# Patient Record
Sex: MALE | Race: WHITE | NOT HISPANIC OR LATINO | Employment: OTHER | URBAN - METROPOLITAN AREA
[De-identification: names, ages, dates, MRNs, and addresses within clinical notes are randomized per-mention and may not be internally consistent; named-entity substitution may affect disease eponyms.]

---

## 2023-03-24 PROBLEM — M79.673 FOOT PAIN: Status: ACTIVE | Noted: 2023-03-24

## 2023-03-24 PROBLEM — I10 HYPERTENSION: Status: ACTIVE | Noted: 2023-03-24

## 2023-03-24 PROBLEM — E78.00 HYPERCHOLESTEROLEMIA: Status: ACTIVE | Noted: 2023-03-24

## 2023-03-24 PROBLEM — M25.572 BILATERAL ANKLE PAIN: Status: ACTIVE | Noted: 2023-03-24

## 2023-03-24 PROBLEM — M25.571 BILATERAL ANKLE PAIN: Status: ACTIVE | Noted: 2023-03-24

## 2023-03-24 RX ORDER — LOSARTAN POTASSIUM 100 MG/1
1 TABLET ORAL DAILY
COMMUNITY
Start: 2021-02-15 | End: 2023-05-24

## 2023-03-24 RX ORDER — CYCLOBENZAPRINE HCL 10 MG
1 TABLET ORAL 3 TIMES DAILY
COMMUNITY
Start: 2022-08-12

## 2023-04-15 LAB
ALANINE AMINOTRANSFERASE (SGPT) (U/L) IN SER/PLAS: 31 U/L (ref 10–52)
ALBUMIN (G/DL) IN SER/PLAS: 4.2 G/DL (ref 3.4–5)
ALKALINE PHOSPHATASE (U/L) IN SER/PLAS: 70 U/L (ref 33–136)
ANION GAP IN SER/PLAS: 11 MMOL/L (ref 10–20)
ASPARTATE AMINOTRANSFERASE (SGOT) (U/L) IN SER/PLAS: 16 U/L (ref 9–39)
BILIRUBIN TOTAL (MG/DL) IN SER/PLAS: 0.6 MG/DL (ref 0–1.2)
CALCIUM (MG/DL) IN SER/PLAS: 9.4 MG/DL (ref 8.6–10.6)
CARBON DIOXIDE, TOTAL (MMOL/L) IN SER/PLAS: 23 MMOL/L (ref 21–32)
CHLORIDE (MMOL/L) IN SER/PLAS: 109 MMOL/L (ref 98–107)
CHOLESTEROL (MG/DL) IN SER/PLAS: 168 MG/DL (ref 0–199)
CHOLESTEROL IN HDL (MG/DL) IN SER/PLAS: 37.5 MG/DL
CHOLESTEROL/HDL RATIO: 4.5
CREATININE (MG/DL) IN SER/PLAS: 1.11 MG/DL (ref 0.5–1.3)
GFR MALE: 73 ML/MIN/1.73M2
GLUCOSE (MG/DL) IN SER/PLAS: 110 MG/DL (ref 74–99)
LDL: 110 MG/DL (ref 0–99)
POTASSIUM (MMOL/L) IN SER/PLAS: 4.8 MMOL/L (ref 3.5–5.3)
PROTEIN TOTAL: 6.6 G/DL (ref 6.4–8.2)
SODIUM (MMOL/L) IN SER/PLAS: 138 MMOL/L (ref 136–145)
TRIGLYCERIDE (MG/DL) IN SER/PLAS: 101 MG/DL (ref 0–149)
UREA NITROGEN (MG/DL) IN SER/PLAS: 20 MG/DL (ref 6–23)
VLDL: 20 MG/DL (ref 0–40)

## 2023-04-17 ENCOUNTER — OFFICE VISIT (OUTPATIENT)
Dept: PRIMARY CARE | Facility: CLINIC | Age: 68
End: 2023-04-17
Payer: MEDICARE

## 2023-04-17 VITALS
DIASTOLIC BLOOD PRESSURE: 70 MMHG | SYSTOLIC BLOOD PRESSURE: 126 MMHG | HEIGHT: 71 IN | BODY MASS INDEX: 26.32 KG/M2 | WEIGHT: 188 LBS

## 2023-04-17 DIAGNOSIS — E78.00 HYPERCHOLESTEROLEMIA: ICD-10-CM

## 2023-04-17 PROCEDURE — 3074F SYST BP LT 130 MM HG: CPT | Performed by: INTERNAL MEDICINE

## 2023-04-17 PROCEDURE — 99213 OFFICE O/P EST LOW 20 MIN: CPT | Performed by: INTERNAL MEDICINE

## 2023-04-17 PROCEDURE — 3078F DIAST BP <80 MM HG: CPT | Performed by: INTERNAL MEDICINE

## 2023-04-17 NOTE — PROGRESS NOTES
"OFFICE NOTE    NAME OF THE PATIENT: Terrence Jackson    YOB: 1955    CHIEF COMPLAINT:  Mr. Jackson today came here for follow-up on various conditions.  He has a success story.  He got first two shots ______ (Leqvio) for his cholesterol and he is doing very good.  No side effects.  He is tolerating well.  He had a blood work done.  Appetite and weight are okay.  No problem.    PAST MEDICAL HISTORY:  Reviewed on EMR, unchanged.    CURRENT MEDICATIONS:  Reviewed on EMR, unchanged.  List reviewed.    ALLERGIES:  Reviewed on EMR, unchanged.    SOCIAL HISTORY:  Reviewed on EMR, unchanged.  He does not smoke and does not drink alcohol.    FAMILY HISTORY:  Reviewed on EMR, unchanged.    REVIEW OF SYSTEMS:  All 12 systems reviewed and pertaining covered in history and physical.    PHYSICAL EXAMINATION  VITAL SIGNS:  As recorded and reviewed from EMR.  RESPIRATORY:  The patient had normal inspirations and expirations.  The breath sounds were equal bilaterally and clear to auscultation.  CARDIOVASCULAR:  The patient had S1 normal, split S2 without obvious rubs, clicks, or murmurs.    GASTROINTESTINAL:  There was no hepatosplenomegaly.  There were no palpable masses and no inguinal nodes.  EXTREMITIES:  Legs had no edema.  NEUROLOGIC:  The patient had normal cranial nerves.  The reflexes, sensory, and motor examination were grossly within normal limits.    LAB WORK:  Laboratory testing discussed.    ASSESSMENT AND PLAN:  High cholesterol.  Numbers are excellent.  Liver okay.  Hypertension, okay.  Kidney and liver fine.  Prostate health, good.  We will repeat blood work in July.  Happy to see him anytime sooner if necessary.      Kindly review this note in conjunction with EMR.     Subjective   Patient ID: Terrence Jackson is a 67 y.o. male who presents for Follow-up.      HPI    Review of Systems    Objective   /70   Ht 1.803 m (5' 11\")   Wt 85.3 kg (188 lb)   BMI 26.22 kg/m²       Physical " Exam    Assessment/Plan   Problem List Items Addressed This Visit    None

## 2023-05-24 DIAGNOSIS — I10 ESSENTIAL (PRIMARY) HYPERTENSION: ICD-10-CM

## 2023-05-24 RX ORDER — LOSARTAN POTASSIUM 100 MG/1
TABLET ORAL
Qty: 90 TABLET | Refills: 1 | Status: SHIPPED | OUTPATIENT
Start: 2023-05-24 | End: 2023-11-16

## 2023-07-12 ENCOUNTER — LAB (OUTPATIENT)
Dept: LAB | Facility: LAB | Age: 68
End: 2023-07-12
Payer: MEDICARE

## 2023-07-12 DIAGNOSIS — E78.00 HYPERCHOLESTEROLEMIA: ICD-10-CM

## 2023-07-12 LAB
ALANINE AMINOTRANSFERASE (SGPT) (U/L) IN SER/PLAS: 25 U/L (ref 10–52)
ALBUMIN (G/DL) IN SER/PLAS: 4.4 G/DL (ref 3.4–5)
ALKALINE PHOSPHATASE (U/L) IN SER/PLAS: 71 U/L (ref 33–136)
ANION GAP IN SER/PLAS: 13 MMOL/L (ref 10–20)
ASPARTATE AMINOTRANSFERASE (SGOT) (U/L) IN SER/PLAS: 16 U/L (ref 9–39)
BILIRUBIN TOTAL (MG/DL) IN SER/PLAS: 0.6 MG/DL (ref 0–1.2)
CALCIUM (MG/DL) IN SER/PLAS: 9.9 MG/DL (ref 8.6–10.6)
CARBON DIOXIDE, TOTAL (MMOL/L) IN SER/PLAS: 25 MMOL/L (ref 21–32)
CHLORIDE (MMOL/L) IN SER/PLAS: 107 MMOL/L (ref 98–107)
CHOLESTEROL (MG/DL) IN SER/PLAS: 168 MG/DL (ref 0–199)
CHOLESTEROL IN HDL (MG/DL) IN SER/PLAS: 33.2 MG/DL
CHOLESTEROL/HDL RATIO: 5.1
CREATININE (MG/DL) IN SER/PLAS: 1.08 MG/DL (ref 0.5–1.3)
GFR MALE: 76 ML/MIN/1.73M2
GLUCOSE (MG/DL) IN SER/PLAS: 106 MG/DL (ref 74–99)
LDL: 89 MG/DL (ref 0–99)
NON HDL CHOLESTEROL: 135 MG/DL
POTASSIUM (MMOL/L) IN SER/PLAS: 4.5 MMOL/L (ref 3.5–5.3)
PROTEIN TOTAL: 6.7 G/DL (ref 6.4–8.2)
SODIUM (MMOL/L) IN SER/PLAS: 140 MMOL/L (ref 136–145)
TRIGLYCERIDE (MG/DL) IN SER/PLAS: 229 MG/DL (ref 0–149)
UREA NITROGEN (MG/DL) IN SER/PLAS: 18 MG/DL (ref 6–23)
VLDL: 46 MG/DL (ref 0–40)

## 2023-07-12 PROCEDURE — 80061 LIPID PANEL: CPT

## 2023-07-12 PROCEDURE — 36415 COLL VENOUS BLD VENIPUNCTURE: CPT

## 2023-07-12 PROCEDURE — 80053 COMPREHEN METABOLIC PANEL: CPT

## 2023-07-13 ENCOUNTER — TELEPHONE (OUTPATIENT)
Dept: PRIMARY CARE | Facility: CLINIC | Age: 68
End: 2023-07-13
Payer: MEDICARE

## 2023-07-14 ENCOUNTER — OFFICE VISIT (OUTPATIENT)
Dept: PRIMARY CARE | Facility: CLINIC | Age: 68
End: 2023-07-14
Payer: MEDICARE

## 2023-07-14 VITALS
DIASTOLIC BLOOD PRESSURE: 72 MMHG | WEIGHT: 185 LBS | SYSTOLIC BLOOD PRESSURE: 142 MMHG | BODY MASS INDEX: 25.9 KG/M2 | HEIGHT: 71 IN

## 2023-07-14 DIAGNOSIS — J40 BRONCHITIS: ICD-10-CM

## 2023-07-14 PROCEDURE — 99214 OFFICE O/P EST MOD 30 MIN: CPT | Performed by: INTERNAL MEDICINE

## 2023-07-14 PROCEDURE — 3077F SYST BP >= 140 MM HG: CPT | Performed by: INTERNAL MEDICINE

## 2023-07-14 PROCEDURE — 1159F MED LIST DOCD IN RCRD: CPT | Performed by: INTERNAL MEDICINE

## 2023-07-14 PROCEDURE — 3078F DIAST BP <80 MM HG: CPT | Performed by: INTERNAL MEDICINE

## 2023-07-14 RX ORDER — AMOXICILLIN AND CLAVULANATE POTASSIUM 875; 125 MG/1; MG/1
875 TABLET, FILM COATED ORAL 2 TIMES DAILY
Qty: 20 TABLET | Refills: 0 | Status: SHIPPED | OUTPATIENT
Start: 2023-07-14 | End: 2023-07-24

## 2023-07-14 RX ORDER — LORATADINE 10 MG/1
10 TABLET ORAL DAILY
Qty: 30 TABLET | Refills: 2 | Status: SHIPPED | OUTPATIENT
Start: 2023-07-14 | End: 2024-06-03 | Stop reason: WASHOUT

## 2023-07-14 NOTE — PROGRESS NOTES
OFFICE NOTE    NAME OF THE PATIENT: Terrence Jackson    YOB: 1955    CHIEF COMPLAINT:   This gentleman today came here for follow-up on various conditions.  Overall, he is a happy fellow.  He is retired.  He is enjoying it.  He lost nine pounds on purpose.  He is taking medications regularly with no side effects.  No chest pain or shortness of breath.    PAST MEDICAL HISTORY:  Reviewed on EMR, unchanged.    CURRENT MEDICATIONS:  Reviewed on EMR, unchanged.  List reviewed.    ALLERGIES:  Reviewed on EMR, unchanged.    SOCIAL HISTORY:  Reviewed on EMR, unchanged.  He does not smoke and does not drink alcohol.    FAMILY HISTORY:  Reviewed on EMR, unchanged.    REVIEW OF SYSTEMS:  All 12 systems reviewed and pertaining covered in history and physical.    PHYSICAL EXAMINATION  VITAL SIGNS:  As recorded and reviewed from EMR.  RESPIRATORY:  The patient had normal inspirations and expirations.  The breath sounds were equal bilaterally and clear to auscultation.  CARDIOVASCULAR:  The patient had S1 normal, split S2 without obvious rubs, clicks, or murmurs.    GASTROINTESTINAL:  There was no hepatosplenomegaly.  There were no palpable masses and no inguinal nodes.  EXTREMITIES:  Legs had no edema.  NEUROLOGIC:  The patient had normal cranial nerves.  The reflexes, sensory, and motor examination were grossly within normal limits.    LAB WORK:  Laboratory testing discussed.    ASSESSMENT AND PLAN:  Hypertension.  Kidney okay.  Cholesterol.  Diet and exercise.  Monitor.  Prostate health, stable.  Colonoscopy up to date.  Do the blood work in three months.  While the patient was here, he has sore throat, congestion, postnasal drip, it looks like congestion.  I gave him Augmentin, Claritin, Robitussin-DM.  He ______ drink enough warm water.  Follow up in three months.  Happy to see him anytime sooner if necessary.      Kindly review this note in conjunction with EMR.     Subjective   Patient ID: Terrence Jackson is a  "68 y.o. male who presents for sick.      HPI    Review of Systems    Objective   /72   Ht 1.803 m (5' 11\")   Wt 83.9 kg (185 lb)   BMI 25.80 kg/m²       Physical Exam    Assessment/Plan   Problem List Items Addressed This Visit    None        "

## 2023-09-12 PROBLEM — H53.9 VISUAL DISTURBANCE: Status: ACTIVE | Noted: 2023-09-12

## 2023-09-12 PROBLEM — H53.419 VISUAL FIELD SCOTOMA: Status: ACTIVE | Noted: 2023-09-12

## 2023-09-12 PROBLEM — H53.9 ABNORMAL VISION: Status: ACTIVE | Noted: 2023-09-12

## 2023-09-12 PROBLEM — E78.5 DYSLIPIDEMIA: Status: ACTIVE | Noted: 2023-09-12

## 2023-09-12 RX ORDER — ACETAMINOPHEN 325 MG/1
2 TABLET ORAL EVERY 4 HOURS PRN
COMMUNITY

## 2023-09-12 RX ORDER — AMLODIPINE BESYLATE 2.5 MG/1
2.5 TABLET ORAL DAILY
COMMUNITY

## 2023-09-12 RX ORDER — TOBRAMYCIN 3 MG/ML
1 SOLUTION/ DROPS OPHTHALMIC EVERY 4 HOURS
COMMUNITY
Start: 2014-08-15

## 2023-09-12 RX ORDER — NAPROXEN SODIUM 220 MG/1
81 TABLET, FILM COATED ORAL DAILY
COMMUNITY

## 2023-09-12 RX ORDER — ATORVASTATIN CALCIUM 20 MG/1
20 TABLET, FILM COATED ORAL NIGHTLY
COMMUNITY

## 2023-09-28 DIAGNOSIS — E78.00 HYPERCHOLESTEREMIA: Primary | ICD-10-CM

## 2023-09-28 RX ORDER — ALBUTEROL SULFATE 0.83 MG/ML
3 SOLUTION RESPIRATORY (INHALATION) AS NEEDED
Status: CANCELLED | OUTPATIENT
Start: 2023-10-06

## 2023-09-28 RX ORDER — DIPHENHYDRAMINE HYDROCHLORIDE 50 MG/ML
50 INJECTION INTRAMUSCULAR; INTRAVENOUS AS NEEDED
Status: CANCELLED | OUTPATIENT
Start: 2023-10-06

## 2023-09-28 RX ORDER — EPINEPHRINE 0.3 MG/.3ML
0.3 INJECTION SUBCUTANEOUS EVERY 5 MIN PRN
Status: CANCELLED | OUTPATIENT
Start: 2023-10-06

## 2023-09-28 RX ORDER — METHYLPREDNISOLONE SODIUM SUCCINATE 40 MG/ML
40 INJECTION INTRAMUSCULAR; INTRAVENOUS AS NEEDED
Status: CANCELLED | OUTPATIENT
Start: 2023-10-06

## 2023-09-28 RX ORDER — FAMOTIDINE 10 MG/ML
20 INJECTION INTRAVENOUS ONCE AS NEEDED
Status: CANCELLED | OUTPATIENT
Start: 2023-10-06

## 2023-10-06 ENCOUNTER — INFUSION (OUTPATIENT)
Dept: INFUSION THERAPY | Facility: CLINIC | Age: 68
End: 2023-10-06
Payer: MEDICARE

## 2023-10-06 VITALS
SYSTOLIC BLOOD PRESSURE: 152 MMHG | DIASTOLIC BLOOD PRESSURE: 92 MMHG | TEMPERATURE: 97.8 F | HEART RATE: 71 BPM | BODY MASS INDEX: 26.11 KG/M2 | OXYGEN SATURATION: 97 % | RESPIRATION RATE: 16 BRPM | WEIGHT: 187.17 LBS

## 2023-10-06 DIAGNOSIS — E78.00 HYPERCHOLESTEREMIA: ICD-10-CM

## 2023-10-06 PROCEDURE — 96372 THER/PROPH/DIAG INJ SC/IM: CPT | Performed by: NURSE PRACTITIONER

## 2023-10-06 RX ORDER — ALBUTEROL SULFATE 0.83 MG/ML
3 SOLUTION RESPIRATORY (INHALATION) AS NEEDED
Status: CANCELLED | OUTPATIENT
Start: 2024-04-03

## 2023-10-06 RX ORDER — DIPHENHYDRAMINE HYDROCHLORIDE 50 MG/ML
50 INJECTION INTRAMUSCULAR; INTRAVENOUS AS NEEDED
Status: CANCELLED | OUTPATIENT
Start: 2024-04-03

## 2023-10-06 RX ORDER — METHYLPREDNISOLONE SODIUM SUCCINATE 40 MG/ML
40 INJECTION INTRAMUSCULAR; INTRAVENOUS AS NEEDED
Status: CANCELLED | OUTPATIENT
Start: 2024-04-03

## 2023-10-06 RX ORDER — FAMOTIDINE 10 MG/ML
20 INJECTION INTRAVENOUS ONCE AS NEEDED
Status: CANCELLED | OUTPATIENT
Start: 2024-04-03

## 2023-10-06 RX ORDER — EPINEPHRINE 0.3 MG/.3ML
0.3 INJECTION SUBCUTANEOUS EVERY 5 MIN PRN
Status: CANCELLED | OUTPATIENT
Start: 2024-04-03

## 2023-10-06 ASSESSMENT — ENCOUNTER SYMPTOMS
DEPRESSION: 0
WHEEZING: 0
CONSTIPATION: 0
SPEECH DIFFICULTY: 0
FEVER: 0
PALPITATIONS: 0
DIZZINESS: 0
NAUSEA: 0
LEG SWELLING: 0
FREQUENCY: 0
DYSURIA: 0
VOMITING: 0
NECK PAIN: 1
COUGH: 0
SORE THROAT: 0
NERVOUS/ANXIOUS: 0
HEADACHES: 1
EYE PROBLEMS: 0
DIARRHEA: 0
DIFFICULTY URINATING: 0
TROUBLE SWALLOWING: 0
FATIGUE: 0
LIGHT-HEADEDNESS: 0
SHORTNESS OF BREATH: 0
BRUISES/BLEEDS EASILY: 0
NUMBNESS: 0
WOUND: 0

## 2023-10-06 ASSESSMENT — PAIN SCALES - GENERAL: PAINLEVEL: 0-NO PAIN

## 2023-10-06 NOTE — PROGRESS NOTES
[facility]   infusion Clinic Note   Date: 2023   Name: Terrence Jackson  : 1955   MRN: [id number]         Reason for Visit: Follow-up and Injections (LEQVIO 284 MG Q 6MO)       Visit Type:: injection     Ordered By:    Accompanied by:Self      Diagnosis: Hypercholesteremia      Allergies:   Allergies as of 10/06/2023    (No Known Allergies)        Current Meds has a current medication list which includes the following prescription(s): acetaminophen, atorvastatin, inclisiran, losartan, amlodipine, aspirin, cyclobenzaprine, loratadine, loratadine, and tobramycin.          Vitals:  Vitals:    10/06/23 0850   BP: (!) 152/92   Pulse: 71   Resp: 16   Temp: 36.6 °C (97.8 °F)   SpO2: 97%   Weight: 84.9 kg (187 lb 2.7 oz)          Infusion Pre-procedure Checklist:   Allergies reviewed: yes   Medications reviewed: yes     Previous reaction to current treatment:No      Assess patient for the concerns below. Document provider notification as appropriate.  - Active or recent infection with/without current antibiotic use No  - Recent or planned invasive dental work No  - Recent or planned surgeries No  - Recently received or plans to receive vaccinations No  - Has treatment related toxicities No  - Is pregnant  N/A    - Does the patient meet criteria to treat? Yes    Provider notified: Not applicable      Pain: 0-no pain [0]'    Is the pain different from normal: No   Is the pain tolerable: n/a   Is your Doctor aware: n/a       Labs: Labs reviewed LIPID PANEL LAST 23 -NEXT LAB DUE ONE MONTH      Fall Risk Screening: Sherin Fall Risk  History of Falling, Immediate or Within 3 Months: No  Secondary Diagnosis: Yes  Intravenous Therapy/Heparin Lock: No  Mental Status: Oriented to own ability       Review of Systems   Constitutional:  Negative for fatigue and fever.   HENT:   Positive for tinnitus. Negative for hearing loss, sore throat and trouble swallowing.    Eyes:  Negative for eye  problems.   Respiratory:  Negative for cough, shortness of breath and wheezing.    Cardiovascular:  Negative for chest pain, leg swelling and palpitations.   Gastrointestinal:  Negative for constipation, diarrhea, nausea and vomiting.   Genitourinary:  Negative for bladder incontinence, difficulty urinating, dysuria and frequency.    Musculoskeletal:  Positive for neck pain.   Skin:  Negative for itching, rash and wound.   Neurological:  Positive for headaches. Negative for dizziness, light-headedness, numbness and speech difficulty.   Hematological:  Does not bruise/bleed easily.   Psychiatric/Behavioral:  Negative for depression. The patient is not nervous/anxious.       Negative for complaint: [] all other systems have been reviewed and are negative for complaint   Infusion Readiness:   Assessment Concerns Related to Infusion: No  Provider notified: n/a      Document Below Only If Indicated:   Patient Education:       Drug Specific Questions:         Weight Based Drug Calculations:  FLAT DOSE       Initiated By: Noemi Bolden RN   Time: 9:29 AM     We administered inclisiran.

## 2023-10-06 NOTE — PATIENT INSTRUCTIONS
Today you received: LEQVIO 284 MG INJECTION    RETURN TO CLINIC IN 6MO FOR NEXT INJECTION.  HAVE LABS DRAWN AT LEAST ONE MONTH AFTER TODAY'S DOSE BUT BEFORE NEXT VISIT.     For:   1. Hypercholesteremia          Please read the  Medication Guide that was given to you and reviewed during todays visit.     (Tell all doctors including dentists that you are taking this medication)     Go to the emergency room or call 911 if:  -You have signs of allergic reaction:   o         Rash, hives, itching.   o         Swollen, blistered, peeling skin.   o         Swelling of face, lips, mouth, tongue or throat.   o         Tightness of chest, trouble breathing, swallowing or talking      Call your doctor:     - If IV / injection site gets red, warm, swollen, itchy or leaks fluid or pus.     (Leave dressing on your IV site for at least 2 hours and keep area clean and dry  - If you get sick or have symptoms of infection or are not feeling well for any reason.    (Wash your hands often, stay away from people who are sick)  - If you have side effects from your medication that do not go away or are bothersome.     (Refer to the teaching your nurse gave you for side effects to call your doctor about)     Common side effects may include:  stuffy nose, headache, feeling tired, muscle aches, upset stomach  - Before receiving any vaccines, Call the [facility] at   if:  - You get sick, are on antibiotics, have had a recent vaccine, have surgery or dental work and your doctor wants your visit rescheduled.  - You need to cancel and reschedule your visit for any reason. Call at least 2 days before your visit if you need to cancel.   - Your insurance changes before your next visit.    (We will need to get approval from your new insurance. This can take up to two weeks.)     The [facility] is opened Monday thru Friday. We are closed on weekends and holidays.     Voice mail will take your call if the center is  closed. If you leave a message please allow 24 hours for a call back during weekdays. If you leave a message on a weekend/holiday, we will call you back the next business day.

## 2023-10-16 ENCOUNTER — LAB (OUTPATIENT)
Dept: LAB | Facility: LAB | Age: 68
End: 2023-10-16
Payer: MEDICARE

## 2023-10-16 DIAGNOSIS — E78.00 HYPERCHOLESTEREMIA: ICD-10-CM

## 2023-10-16 LAB
CHOLEST SERPL-MCNC: 192 MG/DL (ref 0–199)
CHOLESTEROL/HDL RATIO: 4.9
HDLC SERPL-MCNC: 39.3 MG/DL
LDLC SERPL CALC-MCNC: 122 MG/DL
NON HDL CHOLESTEROL: 153 MG/DL (ref 0–149)
TRIGL SERPL-MCNC: 154 MG/DL (ref 0–149)
VLDL: 31 MG/DL (ref 0–40)

## 2023-10-16 PROCEDURE — 36415 COLL VENOUS BLD VENIPUNCTURE: CPT

## 2023-10-16 PROCEDURE — 80061 LIPID PANEL: CPT

## 2023-10-20 ENCOUNTER — OFFICE VISIT (OUTPATIENT)
Dept: PRIMARY CARE | Facility: CLINIC | Age: 68
End: 2023-10-20
Payer: MEDICARE

## 2023-10-20 VITALS
DIASTOLIC BLOOD PRESSURE: 66 MMHG | BODY MASS INDEX: 26.04 KG/M2 | SYSTOLIC BLOOD PRESSURE: 134 MMHG | WEIGHT: 186 LBS | HEIGHT: 71 IN

## 2023-10-20 DIAGNOSIS — E78.00 HYPERCHOLESTEROLEMIA: ICD-10-CM

## 2023-10-20 DIAGNOSIS — Z12.5 ENCOUNTER FOR PROSTATE CANCER SCREENING: ICD-10-CM

## 2023-10-20 DIAGNOSIS — Z80.42 FAMILY HISTORY OF PROSTATE CANCER: ICD-10-CM

## 2023-10-20 DIAGNOSIS — J40 BRONCHITIS: ICD-10-CM

## 2023-10-20 DIAGNOSIS — J32.9 RHINOSINUSITIS: Primary | ICD-10-CM

## 2023-10-20 DIAGNOSIS — I10 PRIMARY HYPERTENSION: ICD-10-CM

## 2023-10-20 DIAGNOSIS — J02.9 PHARYNGITIS, UNSPECIFIED ETIOLOGY: ICD-10-CM

## 2023-10-20 PROCEDURE — 1126F AMNT PAIN NOTED NONE PRSNT: CPT | Performed by: INTERNAL MEDICINE

## 2023-10-20 PROCEDURE — 99214 OFFICE O/P EST MOD 30 MIN: CPT | Performed by: INTERNAL MEDICINE

## 2023-10-20 PROCEDURE — 3078F DIAST BP <80 MM HG: CPT | Performed by: INTERNAL MEDICINE

## 2023-10-20 PROCEDURE — 1159F MED LIST DOCD IN RCRD: CPT | Performed by: INTERNAL MEDICINE

## 2023-10-20 PROCEDURE — 3075F SYST BP GE 130 - 139MM HG: CPT | Performed by: INTERNAL MEDICINE

## 2023-10-20 RX ORDER — AZITHROMYCIN 250 MG/1
TABLET, FILM COATED ORAL
Qty: 6 TABLET | Refills: 0 | Status: SHIPPED | OUTPATIENT
Start: 2023-10-20 | End: 2023-10-25

## 2023-10-20 ASSESSMENT — ENCOUNTER SYMPTOMS
DEPRESSION: 0
LOSS OF SENSATION IN FEET: 0
OCCASIONAL FEELINGS OF UNSTEADINESS: 0

## 2023-10-20 NOTE — PROGRESS NOTES
"Subjective   Patient ID: Terrence Jackson is a 68 y.o. male who presents for Follow-up (multiple medical issue.).    Terrence Jackson today came here for multiple medical issue.  1. Cough, yellow sputum, sinus congestion, bronchitis, headache going on for several days.  Over-the-counter medications not helping.  2. Follow up on blood work.  Appetite and weight are okay.  No problem.  He golfs two to three times a week and he walks all 16 holes.    I have personally reviewed the patient's Past Medical History, Medications, Allergies, Social History, and Family History in the EMR.    Review of Systems   All other systems reviewed and are negative.    Objective   /66   Ht 1.803 m (5' 11\")   Wt 84.4 kg (186 lb)   BMI 25.94 kg/m²     Physical Exam  Vitals reviewed.   HENT:      Nose: Congestion present.      Comments: Postnasal drip.     Mouth/Throat:      Comments: Throat congested.  Cardiovascular:      Heart sounds: Normal heart sounds, S1 normal and S2 normal. No murmur heard.     No friction rub.   Pulmonary:      Effort: Pulmonary effort is normal.      Breath sounds: Wheezing present.   Abdominal:      Palpations: There is no hepatomegaly, splenomegaly or mass.   Musculoskeletal:      Right lower leg: No edema.      Left lower leg: No edema.   Lymphadenopathy:      Lower Body: No right inguinal adenopathy. No left inguinal adenopathy.   Neurological:      Cranial Nerves: Cranial nerves 2-12 are intact.      Sensory: No sensory deficit.      Motor: Motor function is intact.      Deep Tendon Reflexes: Reflexes are normal and symmetric.     LAB WORK: Laboratory testing discussed.    Assessment/Plan   Problem List Items Addressed This Visit             ICD-10-CM       Cardiac and Vasculature    Hypercholesterolemia E78.00    Relevant Orders    Comprehensive metabolic panel    Lipid panel    Hypertension I10    Relevant Orders    TSH     Other Visit Diagnoses         Codes    Rhinosinusitis    -  Primary J32.9 "    Encounter for prostate cancer screening     Z12.5    Relevant Orders    Prostate Spec.Ag,Screen    Bronchitis     J40    Relevant Medications    azithromycin (Zithromax) 250 mg tablet    Pharyngitis, unspecified etiology     J02.9    Family history of prostate cancer     Z80.42        1. Rhinosinusitis, pharyngitis and bronchitis.  Z-PETEY, Claritin, Robitussin DM.  Monitor.  2. Family history of prostate cancer.  His PSA last year okay.  I will keep an eye.  He has no symptoms.  3. Hypertension, okay.  4. High cholesterol, stable.  5. Blood work ordered.  6. His Medicare Wellness is due.  We are going to do in December.  Continue to follow.    Scribe Attestation  By signing my name below, IEllen Scribe attest that this documentation has been prepared under the direction and in the presence of Ry Acosta MD.    negative...

## 2023-11-27 ENCOUNTER — LAB (OUTPATIENT)
Dept: LAB | Facility: LAB | Age: 68
End: 2023-11-27
Payer: MEDICARE

## 2023-11-27 DIAGNOSIS — E78.00 HYPERCHOLESTEROLEMIA: ICD-10-CM

## 2023-11-27 DIAGNOSIS — Z12.5 ENCOUNTER FOR PROSTATE CANCER SCREENING: ICD-10-CM

## 2023-11-27 DIAGNOSIS — I10 PRIMARY HYPERTENSION: ICD-10-CM

## 2023-11-27 LAB
ALBUMIN SERPL BCP-MCNC: 4.4 G/DL (ref 3.4–5)
ALP SERPL-CCNC: 66 U/L (ref 33–136)
ALT SERPL W P-5'-P-CCNC: 27 U/L (ref 10–52)
ANION GAP SERPL CALC-SCNC: 14 MMOL/L (ref 10–20)
AST SERPL W P-5'-P-CCNC: 17 U/L (ref 9–39)
BILIRUB SERPL-MCNC: 0.7 MG/DL (ref 0–1.2)
BUN SERPL-MCNC: 18 MG/DL (ref 6–23)
CALCIUM SERPL-MCNC: 9.6 MG/DL (ref 8.6–10.6)
CHLORIDE SERPL-SCNC: 107 MMOL/L (ref 98–107)
CHOLEST SERPL-MCNC: 167 MG/DL (ref 0–199)
CHOLESTEROL/HDL RATIO: 4.7
CO2 SERPL-SCNC: 24 MMOL/L (ref 21–32)
CREAT SERPL-MCNC: 1.11 MG/DL (ref 0.5–1.3)
GFR SERPL CREATININE-BSD FRML MDRD: 73 ML/MIN/1.73M*2
GLUCOSE SERPL-MCNC: 105 MG/DL (ref 74–99)
HDLC SERPL-MCNC: 35.3 MG/DL
LDLC SERPL CALC-MCNC: 98 MG/DL
NON HDL CHOLESTEROL: 132 MG/DL (ref 0–149)
POTASSIUM SERPL-SCNC: 4.4 MMOL/L (ref 3.5–5.3)
PROT SERPL-MCNC: 6.9 G/DL (ref 6.4–8.2)
PSA SERPL-MCNC: 0.77 NG/ML
SODIUM SERPL-SCNC: 141 MMOL/L (ref 136–145)
TRIGL SERPL-MCNC: 169 MG/DL (ref 0–149)
TSH SERPL-ACNC: 2.71 MIU/L (ref 0.44–3.98)
VLDL: 34 MG/DL (ref 0–40)

## 2023-11-27 PROCEDURE — 80053 COMPREHEN METABOLIC PANEL: CPT

## 2023-11-27 PROCEDURE — 36415 COLL VENOUS BLD VENIPUNCTURE: CPT

## 2023-11-27 PROCEDURE — 84443 ASSAY THYROID STIM HORMONE: CPT

## 2023-11-27 PROCEDURE — G0103 PSA SCREENING: HCPCS

## 2023-11-27 PROCEDURE — 80061 LIPID PANEL: CPT

## 2023-12-01 ENCOUNTER — OFFICE VISIT (OUTPATIENT)
Dept: PRIMARY CARE | Facility: CLINIC | Age: 68
End: 2023-12-01
Payer: MEDICARE

## 2023-12-01 VITALS
WEIGHT: 190 LBS | DIASTOLIC BLOOD PRESSURE: 74 MMHG | SYSTOLIC BLOOD PRESSURE: 122 MMHG | BODY MASS INDEX: 26.6 KG/M2 | HEIGHT: 71 IN

## 2023-12-01 DIAGNOSIS — Z00.00 MEDICARE ANNUAL WELLNESS VISIT, INITIAL: Primary | ICD-10-CM

## 2023-12-01 DIAGNOSIS — Z13.29 THYROID DISORDER SCREENING: ICD-10-CM

## 2023-12-01 DIAGNOSIS — E78.00 HYPERCHOLESTEROLEMIA: ICD-10-CM

## 2023-12-01 DIAGNOSIS — J40 BRONCHITIS: ICD-10-CM

## 2023-12-01 DIAGNOSIS — H91.90 HEARING LOSS, UNSPECIFIED HEARING LOSS TYPE, UNSPECIFIED LATERALITY: ICD-10-CM

## 2023-12-01 PROCEDURE — 1126F AMNT PAIN NOTED NONE PRSNT: CPT | Performed by: INTERNAL MEDICINE

## 2023-12-01 PROCEDURE — 3074F SYST BP LT 130 MM HG: CPT | Performed by: INTERNAL MEDICINE

## 2023-12-01 PROCEDURE — 1170F FXNL STATUS ASSESSED: CPT | Performed by: INTERNAL MEDICINE

## 2023-12-01 PROCEDURE — G0439 PPPS, SUBSEQ VISIT: HCPCS | Performed by: INTERNAL MEDICINE

## 2023-12-01 PROCEDURE — 1159F MED LIST DOCD IN RCRD: CPT | Performed by: INTERNAL MEDICINE

## 2023-12-01 PROCEDURE — 99214 OFFICE O/P EST MOD 30 MIN: CPT | Performed by: INTERNAL MEDICINE

## 2023-12-01 PROCEDURE — 1160F RVW MEDS BY RX/DR IN RCRD: CPT | Performed by: INTERNAL MEDICINE

## 2023-12-01 PROCEDURE — 3078F DIAST BP <80 MM HG: CPT | Performed by: INTERNAL MEDICINE

## 2023-12-01 RX ORDER — LEVOFLOXACIN 500 MG/1
500 TABLET, FILM COATED ORAL DAILY
Qty: 10 TABLET | Refills: 0 | Status: SHIPPED | OUTPATIENT
Start: 2023-12-01 | End: 2023-12-11

## 2023-12-01 RX ORDER — LORATADINE 10 MG/1
10 TABLET ORAL DAILY
Qty: 30 TABLET | Refills: 2 | Status: SHIPPED | OUTPATIENT
Start: 2023-12-01 | End: 2023-12-23

## 2023-12-01 RX ORDER — GUAIFENESIN 100 MG/5ML
200 SOLUTION ORAL 3 TIMES DAILY PRN
Qty: 120 ML | Refills: 0 | Status: SHIPPED | OUTPATIENT
Start: 2023-12-01 | End: 2023-12-11

## 2023-12-01 ASSESSMENT — ENCOUNTER SYMPTOMS
OCCASIONAL FEELINGS OF UNSTEADINESS: 0
LOSS OF SENSATION IN FEET: 0
DEPRESSION: 0

## 2023-12-01 ASSESSMENT — ACTIVITIES OF DAILY LIVING (ADL)
MANAGING_FINANCES: INDEPENDENT
DOING_HOUSEWORK: INDEPENDENT
BATHING: INDEPENDENT
TAKING_MEDICATION: INDEPENDENT
DRESSING: INDEPENDENT
GROCERY_SHOPPING: INDEPENDENT

## 2023-12-01 ASSESSMENT — PATIENT HEALTH QUESTIONNAIRE - PHQ9
SUM OF ALL RESPONSES TO PHQ9 QUESTIONS 1 AND 2: 0
1. LITTLE INTEREST OR PLEASURE IN DOING THINGS: NOT AT ALL
2. FEELING DOWN, DEPRESSED OR HOPELESS: NOT AT ALL

## 2023-12-01 NOTE — PROGRESS NOTES
"Subjective   Patient ID: Terrence Jackson is a 68 y.o. male who presents for Follow-up (multiple medical issues.).    This gentleman today came here for multiple medical issues.  1. Medicare Wellness visit.  He filled out a form.  2. Bronchitis and cough.  Z-Jarrod did not help.  3. He is getting some hard of hearing.  4. Follow-up on blood work and other conditions.    IMMUNIZATION: I will check his pneumonia and shingles shots.  He needs COVID shot.    HEALTH MAINTENANCE: He had a colonoscopy.    I have personally reviewed the patient's Past Medical History, Medications, Allergies, Social History, and Family History in the EMR.    Review of Systems   All other systems reviewed and are negative.  The patient never had a stroke.  No heart attack.  No diabetes.  No cancer.    Objective   /74   Ht 1.803 m (5' 11\")   Wt 86.2 kg (190 lb)   BMI 26.50 kg/m²     Physical Exam  Vitals reviewed.   HENT:      Right Ear: Tympanic membrane, ear canal and external ear normal.      Left Ear: Tympanic membrane, ear canal and external ear normal.      Nose: Congestion present.      Comments: Postnasal drip.     Mouth/Throat:      Comments: Throat congested.  Eyes:      General: No scleral icterus.     Pupils: Pupils are equal, round, and reactive to light.   Neck:      Vascular: No carotid bruit.   Cardiovascular:      Heart sounds: Normal heart sounds, S1 normal and S2 normal. No murmur heard.     No friction rub.   Pulmonary:      Effort: Pulmonary effort is normal.      Breath sounds: Normal breath sounds and air entry.   Abdominal:      Palpations: There is no hepatomegaly, splenomegaly or mass.   Genitourinary:     Prostate: Enlarged (slightly).   Musculoskeletal:         General: No swelling or deformity. Normal range of motion.      Cervical back: Neck supple.      Right lower leg: No edema.      Left lower leg: No edema.   Lymphadenopathy:      Cervical: No cervical adenopathy.      Upper Body:      Right upper body: " No axillary adenopathy.      Left upper body: No axillary adenopathy.      Lower Body: No right inguinal adenopathy. No left inguinal adenopathy.   Neurological:      Mental Status: He is oriented to person, place, and time.      Cranial Nerves: Cranial nerves 2-12 are intact. No cranial nerve deficit.      Sensory: No sensory deficit.      Motor: Motor function is intact. No weakness.      Gait: Gait is intact.      Deep Tendon Reflexes: Reflexes normal.   Psychiatric:         Mood and Affect: Mood normal. Mood is not anxious or depressed. Affect is not angry.         Behavior: Behavior is not agitated.         Thought Content: Thought content normal.         Judgment: Judgment normal.     LAB WORK: Laboratory testing done last week, discussed.    Assessment/Plan   Problem List Items Addressed This Visit             ICD-10-CM       Cardiac and Vasculature    Hypercholesterolemia E78.00     Other Visit Diagnoses         Codes    Medicare annual wellness visit, initial    -  Primary Z00.00    Hearing loss, unspecified hearing loss type, unspecified laterality     H91.90    Relevant Orders    Tympanometry    Bronchitis     J40    Relevant Medications    levoFLOXacin (Levaquin) 500 mg tablet    loratadine (Claritin) 10 mg tablet    guaiFENesin (Robitussin) 100 mg/5 mL syrup    Thyroid disorder screening     Z13.29        1. Medicare Wellness visit done.  2. The patient is not depressed, not suicidal.  The patient is full code.  The patient gets regular dental and eye checkup.  3. Some hard of hearing.  Audiogram ordered.  4. Bronchitis.  Not better.  I changed to Levaquin and inhaler.  5. High cholesterol.  Numbers good.  Liver good.  6. Prostate health.  PSA okay.  7. Immunization.  I will update immunization once he gets better.  8. Follow up in about three to four weeks.  Happy to see him anytime sooner if necessary.    Scribe Attestation  By signing my name below, IEllen, Scribe attest that this documentation  has been prepared under the direction and in the presence of Ry Acosta MD.

## 2024-01-03 ENCOUNTER — LAB (OUTPATIENT)
Dept: LAB | Facility: LAB | Age: 69
End: 2024-01-03
Payer: MEDICARE

## 2024-01-03 DIAGNOSIS — I10 PRIMARY HYPERTENSION: ICD-10-CM

## 2024-01-03 DIAGNOSIS — E78.00 HYPERCHOLESTEROLEMIA: ICD-10-CM

## 2024-01-08 ENCOUNTER — APPOINTMENT (OUTPATIENT)
Dept: PRIMARY CARE | Facility: CLINIC | Age: 69
End: 2024-01-08
Payer: MEDICARE

## 2024-01-08 ENCOUNTER — CLINICAL SUPPORT (OUTPATIENT)
Dept: AUDIOLOGY | Facility: CLINIC | Age: 69
End: 2024-01-08
Payer: MEDICARE

## 2024-01-08 DIAGNOSIS — H93.13 SUBJECTIVE TINNITUS OF BOTH EARS: ICD-10-CM

## 2024-01-08 DIAGNOSIS — H90.3 SENSORINEURAL HEARING LOSS (SNHL) OF BOTH EARS: Primary | ICD-10-CM

## 2024-01-08 PROCEDURE — 92557 COMPREHENSIVE HEARING TEST: CPT | Performed by: SOCIAL WORKER

## 2024-01-08 PROCEDURE — 92550 TYMPANOMETRY & REFLEX THRESH: CPT | Performed by: SOCIAL WORKER

## 2024-01-08 NOTE — PROGRESS NOTES
Name: Terrence Jackson  YOB: 1955  Age: 68 y.o.    Date of Evaluation:  1/8/24    History:  Reason for visit:  Terrence Jackson is seen today at the request of Ry Acosta MD for an evaluation of hearing.  Patient complains of Hearing Loss and tinnitus for several years. He denies any aural fullness or ear pain. He denies any falls or dizziness.  Positive history of noise exposure    Evaluation:  Otoscopy revealed mild cerumen in the left ear and a clear right canal  Immittance testing indicated Type A normal tympanograms with normal ear canal volume bilaterally  Ipsilateral acoustic reflexes were present at 500-2000 Hz and absent at 4000 Hz bilaterally  Otoacoustic emissions were presnt at 2578-3130 and at 2830 Hz in the Right ear only.   DPOAEs were absent at 3766-4086 Hz in the Left ear    Behavioral hearing testing indicated a mild sloping to moderate sensorineural hearing loss in the right ear and a mild sloping to moderate severe sensorineural hearing loss in the left ear  Word recognition testing was completed using recorded speech at the patient's most comfortable level as documented on the audiogram.  Scores were 92% in the right ear and 72% correct in the left ear.               Summary:  Today's results are consistent with a mild sloping to moderate sensorineural hearing loss in the right ear and a mild sloping to moderate severe sensorineural hearing loss in the left ear. Speech understanding was excellent at conversational loudness in the right ear and fair at louder than conversational loudness in the left ear  Due to asymmetric hearing loss and tinnitus, it is recommended that patient follow up with an Ear, Nose and Throat (ENT) physician. Phone numbers were provided to the patient today  A tinnitus pamphlet was provided to the patient today    Treatment Plan:  Follow up with PCP as directed  Pursue an appointment with ENT  Retest hearing in 12 months

## 2024-01-22 ENCOUNTER — OFFICE VISIT (OUTPATIENT)
Dept: OTOLARYNGOLOGY | Facility: CLINIC | Age: 69
End: 2024-01-22
Payer: MEDICARE

## 2024-01-22 DIAGNOSIS — H91.93 DECREASED HEARING OF BOTH EARS: Primary | ICD-10-CM

## 2024-01-22 PROCEDURE — 1126F AMNT PAIN NOTED NONE PRSNT: CPT | Performed by: OTOLARYNGOLOGY

## 2024-01-22 PROCEDURE — 99203 OFFICE O/P NEW LOW 30 MIN: CPT | Performed by: OTOLARYNGOLOGY

## 2024-01-22 PROCEDURE — 1160F RVW MEDS BY RX/DR IN RCRD: CPT | Performed by: OTOLARYNGOLOGY

## 2024-01-22 ASSESSMENT — ENCOUNTER SYMPTOMS
LOSS OF SENSATION IN FEET: 0
DEPRESSION: 0
OCCASIONAL FEELINGS OF UNSTEADINESS: 0

## 2024-01-22 NOTE — PROGRESS NOTES
"  History Of Present Illness    01.22.2024: Terrence Jackson is a 68 y.o. male presenting with: \"Audio issues\".  He is kindly referred by Dr. Rick Campos.    He has difficulty with hearing in his left ear. It feels \"spongy\".   This has been going on for years.  He has worked in a steel factory for 6 years.  Acoustic trauma (+)  He had a hearing test with Dr. Rick Campos on 01.08.2024. It shows bilateral SN hearing loss. There is asymmetry at middle to high frequencies, but left ear catches right at 8 kHz.    On examination, there was little wax in ears. Cleaning was done. TMs look intact bilaterally.    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 I recommend to follow up in one year with hearing test.     Past Medical History  He has a past medical history of Arthritis, High cholesterol, and Hypertension.    Surgical History  He has a past surgical history that includes MR angio neck wo IV contrast (1/29/2021).     Social History  He reports that he has quit smoking. His smoking use included cigarettes. He does not have any smokeless tobacco history on file. He reports that he does not drink alcohol. No history on file for drug use.    Family History  Family History   Problem Relation Name Age of Onset    No Known Problems Mother      Depression Father          Depressed because of multiple issues.  He commited suicide        Allergies  Patient has no known allergies.    Review of Systems   None reported     Physical Exam    General appearance: Healthy-appearing, well-nourished, well groomed, in no acute distress.     Head and Face: Atraumatic with no masses, lesions, or scarring.      Salivary glands: No tenderness of the parotid glands or parotid masses.     No tenderness of the submandibular glands or submandibular masses.      Facial strength: Normal strength and symmetry, no synkinesis or " "facial tic.     Eyes: Conjunctivas look non-hyperemic bilaterally    Ears: Bilaterally ear canals look normal. Tympanic membranes look intact, no hyperemia, fluid or retraction.    Nose: Mucosa looks normal. No purulent discharge.     Oral Cavity/Mouth: Lips and tongue look normal.     Throat: No postnasal discharge. No tonsil hypertrophy. No hyperemia.    Neck: Symmetrical, trachea midline.     Pulmonary: Normal respiratory effort.     Lymphatic: No palpable pathologic lymph nodes at neck.     Neurological/Psychiatric Orientation to person, place, and time: Normal.     Mood and affect: Normal.      Extremities: No clubbing.     Skin: No significant skin lesions were noted at face or neck        Last Recorded Vitals  There were no vitals taken for this visit.    Relevant Results    Assessment and Plan:  Terrence Jackson is a 68 y.o. male presenting with: \"Audio issues\".  He is kindly referred by Dr. Rick Campos.    He has difficulty with hearing in his left ear. It feel spongy.   This has been going on for years.  He has worked in a steel factory for 6 years.  Acoustic trauma (+)  He had a hearing test with Dr. Rick Campos on 01.08.2024. It shows bilateral SN hearing loss. There is asymmetry at middle to high frequencies, but left ear catches right at 8 kHz.  On examination, there was little wax in ears. Cleaning was done. TMs look intact bilaterally.    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 I recommend to follow up in one year with hearing test.      Thierry Tim  Otolaryngology - Head & Neck Surgery    "

## 2024-01-22 NOTE — LETTER
"January 22, 2024     Rick Campos, AUD, [facility]  [address] [facility]  Audiology Services  [facility] [address]    Patient: Terrence Jackson   YOB: 1955   Date of Visit: 1/22/2024       Dear Rick,    Thank you for referring Mr. Terrence Jackson to me for evaluation. Below are my notes for this consultation.  If you have questions, please do not hesitate to call me. I look forward to following your patient along with you.       Sincerely,     Thierry Tim MD      CC: No Recipients  ______________________________________________________________________________________    History Of Present Illness    Terrence Jackson is a 68 y.o. male presenting with: \"Audio issues\".  He is kindly referred by Dr. Rick Campos.    He has difficulty with hearing in his left ear. It feels \"spongy\".   This has been going on for years.  He has worked in a steel factory for 6 years.  Acoustic trauma (+)  He had a hearing test with Dr. Rcik Campos on 01.08.2024. It shows bilateral SN hearing loss. There is asymmetry at middle to high frequencies, but left ear catches right at 8 kHz.    On examination, there was little wax in ears. Cleaning was done. TMs look intact bilaterally.    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 I recommend to follow up in one year with hearing test.     Past Medical History  He has a past medical history of Arthritis, High cholesterol, and Hypertension.    Surgical History  He has a past surgical history that includes MR angio neck wo IV contrast (1/29/2021).     Social History  He reports that he has quit smoking. His smoking use included cigarettes. He does not have any smokeless tobacco history on file. He reports that he does not drink alcohol. No history on file for drug use.    Family History  Family History   Problem Relation Name Age of Onset   • No Known Problems Mother   " "  • Depression Father          Depressed because of multiple issues.  He commited suicide        Allergies  Patient has no known allergies.    Review of Systems   None reported     Physical Exam    General appearance: Healthy-appearing, well-nourished, well groomed, in no acute distress.     Head and Face: Atraumatic with no masses, lesions, or scarring.      Salivary glands: No tenderness of the parotid glands or parotid masses.     No tenderness of the submandibular glands or submandibular masses.      Facial strength: Normal strength and symmetry, no synkinesis or facial tic.     Eyes: Conjunctivas look non-hyperemic bilaterally    Ears: Bilaterally ear canals look normal. Tympanic membranes look intact, no hyperemia, fluid or retraction.    Nose: Mucosa looks normal. No purulent discharge.     Oral Cavity/Mouth: Lips and tongue look normal.     Throat: No postnasal discharge. No tonsil hypertrophy. No hyperemia.    Neck: Symmetrical, trachea midline.     Pulmonary: Normal respiratory effort.     Lymphatic: No palpable pathologic lymph nodes at neck.     Neurological/Psychiatric Orientation to person, place, and time: Normal.     Mood and affect: Normal.      Extremities: No clubbing.     Skin: No significant skin lesions were noted at face or neck        Last Recorded Vitals  There were no vitals taken for this visit.    Relevant Results    Assessment and Plan:  Terrence Jackson is a 68 y.o. male presenting with: \"Audio issues\".  He is kindly referred by Dr. Rick Campos.    He has difficulty with hearing in his left ear. It feel spongy.   This has been going on for years.  He has worked in a steel factory for 6 years.  Acoustic trauma (+)  He had a hearing test with Dr. Rick Campos on 01.08.2024. It shows bilateral SN hearing loss. There is asymmetry at middle to high frequencies, but left ear catches right at 8 kHz.  On examination, there was little wax in ears. Cleaning was done. TMs look intact " bilaterally.    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 I recommend to follow up in one year with hearing test.      Thierry Tim  Otolaryngology - Head & Neck Surgery

## 2024-02-07 ENCOUNTER — LAB (OUTPATIENT)
Dept: LAB | Facility: LAB | Age: 69
End: 2024-02-07
Payer: MEDICARE

## 2024-02-07 DIAGNOSIS — I10 PRIMARY HYPERTENSION: ICD-10-CM

## 2024-02-07 DIAGNOSIS — E78.00 HYPERCHOLESTEROLEMIA: ICD-10-CM

## 2024-02-07 LAB
ALBUMIN SERPL BCP-MCNC: 4.3 G/DL (ref 3.4–5)
ALP SERPL-CCNC: 70 U/L (ref 33–136)
ALT SERPL W P-5'-P-CCNC: 23 U/L (ref 10–52)
ANION GAP SERPL CALC-SCNC: 9 MMOL/L (ref 10–20)
AST SERPL W P-5'-P-CCNC: 17 U/L (ref 9–39)
BILIRUB SERPL-MCNC: 0.6 MG/DL (ref 0–1.2)
BUN SERPL-MCNC: 19 MG/DL (ref 6–23)
CALCIUM SERPL-MCNC: 9.7 MG/DL (ref 8.6–10.6)
CHLORIDE SERPL-SCNC: 105 MMOL/L (ref 98–107)
CHOLEST SERPL-MCNC: 189 MG/DL (ref 0–199)
CHOLESTEROL/HDL RATIO: 6.1
CO2 SERPL-SCNC: 30 MMOL/L (ref 21–32)
CREAT SERPL-MCNC: 1.17 MG/DL (ref 0.5–1.3)
EGFRCR SERPLBLD CKD-EPI 2021: 69 ML/MIN/1.73M*2
GLUCOSE SERPL-MCNC: 102 MG/DL (ref 74–99)
HDLC SERPL-MCNC: 30.9 MG/DL
LDLC SERPL CALC-MCNC: 115 MG/DL
NON HDL CHOLESTEROL: 158 MG/DL (ref 0–149)
POTASSIUM SERPL-SCNC: 4.4 MMOL/L (ref 3.5–5.3)
PROT SERPL-MCNC: 6.9 G/DL (ref 6.4–8.2)
SODIUM SERPL-SCNC: 140 MMOL/L (ref 136–145)
TRIGL SERPL-MCNC: 215 MG/DL (ref 0–149)
VLDL: 43 MG/DL (ref 0–40)

## 2024-02-07 PROCEDURE — 36415 COLL VENOUS BLD VENIPUNCTURE: CPT

## 2024-02-07 PROCEDURE — 80061 LIPID PANEL: CPT

## 2024-02-07 PROCEDURE — 80053 COMPREHEN METABOLIC PANEL: CPT

## 2024-02-13 ENCOUNTER — OFFICE VISIT (OUTPATIENT)
Dept: PRIMARY CARE | Facility: CLINIC | Age: 69
End: 2024-02-13
Payer: MEDICARE

## 2024-02-13 VITALS
DIASTOLIC BLOOD PRESSURE: 72 MMHG | SYSTOLIC BLOOD PRESSURE: 136 MMHG | HEIGHT: 71 IN | WEIGHT: 190 LBS | BODY MASS INDEX: 26.6 KG/M2

## 2024-02-13 DIAGNOSIS — I10 PRIMARY HYPERTENSION: ICD-10-CM

## 2024-02-13 DIAGNOSIS — K21.9 GASTROESOPHAGEAL REFLUX DISEASE, UNSPECIFIED WHETHER ESOPHAGITIS PRESENT: ICD-10-CM

## 2024-02-13 DIAGNOSIS — E78.00 HYPERCHOLESTEROLEMIA: ICD-10-CM

## 2024-02-13 DIAGNOSIS — T78.40XA ALLERGY, INITIAL ENCOUNTER: Primary | ICD-10-CM

## 2024-02-13 PROCEDURE — 1159F MED LIST DOCD IN RCRD: CPT | Performed by: INTERNAL MEDICINE

## 2024-02-13 PROCEDURE — 3078F DIAST BP <80 MM HG: CPT | Performed by: INTERNAL MEDICINE

## 2024-02-13 PROCEDURE — 99213 OFFICE O/P EST LOW 20 MIN: CPT | Performed by: INTERNAL MEDICINE

## 2024-02-13 PROCEDURE — 3075F SYST BP GE 130 - 139MM HG: CPT | Performed by: INTERNAL MEDICINE

## 2024-02-13 PROCEDURE — 1126F AMNT PAIN NOTED NONE PRSNT: CPT | Performed by: INTERNAL MEDICINE

## 2024-02-13 NOTE — PROGRESS NOTES
"Subjective   Patient ID: Terrence Jackson is a 68 y.o. male who presents for Follow-up (on various conditions).    It is always delight to serve Mr. Jackson.  Today, he came here for follow-up on various conditions.  Appetite and weight are okay.  No problem.    I have personally reviewed the patient's Past Medical History, Medications, Allergies, Social History, and Family History in the EMR.    Review of Systems   All other systems reviewed and are negative.    Objective   /72   Ht 1.803 m (5' 11\")   Wt 86.2 kg (190 lb)   BMI 26.50 kg/m²     Physical Exam  Vitals reviewed.   Cardiovascular:      Heart sounds: Normal heart sounds, S1 normal and S2 normal. No murmur heard.     No friction rub.   Pulmonary:      Effort: Pulmonary effort is normal.      Breath sounds: Normal breath sounds and air entry.   Abdominal:      Palpations: There is no hepatomegaly, splenomegaly or mass.   Musculoskeletal:      Right lower leg: No edema.      Left lower leg: No edema.   Lymphadenopathy:      Lower Body: No right inguinal adenopathy. No left inguinal adenopathy.   Neurological:      Cranial Nerves: Cranial nerves 2-12 are intact.      Sensory: No sensory deficit.      Motor: Motor function is intact.      Deep Tendon Reflexes: Reflexes are normal and symmetric.     LAB WORK: Laboratory testing discussed.    Assessment/Plan   Problem List Items Addressed This Visit             ICD-10-CM       Cardiac and Vasculature    Hypercholesterolemia E78.00    Relevant Orders    Comprehensive Metabolic Panel    Lipid Panel    Ammonia    Hypertension I10    Relevant Orders    Comprehensive Metabolic Panel    Lipid Panel    Ammonia     Other Visit Diagnoses         Codes    Allergy, initial encounter    -  Primary T78.40XA    Gastroesophageal reflux disease, unspecified whether esophagitis present     K21.9        1. Hypertension, okay.  2. Cholesterol.  Shot working very well.  No side effects.  3. Allergy, stable.  4. GERD, on " PPI.  5. Follow-up appointment with me in three months.  Happy to see him anytime sooner if necessary.    Scribe Attestation  By signing my name below, I, Shena Hartman attest that this documentation has been prepared under the direction and in the presence of Ry Acosta MD.

## 2024-02-14 ENCOUNTER — APPOINTMENT (OUTPATIENT)
Dept: PRIMARY CARE | Facility: CLINIC | Age: 69
End: 2024-02-14
Payer: MEDICARE

## 2024-04-12 ENCOUNTER — INFUSION (OUTPATIENT)
Dept: INFUSION THERAPY | Facility: CLINIC | Age: 69
End: 2024-04-12
Payer: MEDICARE

## 2024-04-12 VITALS
BODY MASS INDEX: 26.94 KG/M2 | SYSTOLIC BLOOD PRESSURE: 164 MMHG | DIASTOLIC BLOOD PRESSURE: 89 MMHG | OXYGEN SATURATION: 99 % | RESPIRATION RATE: 17 BRPM | WEIGHT: 193.12 LBS | HEART RATE: 73 BPM | TEMPERATURE: 98.1 F

## 2024-04-12 DIAGNOSIS — E78.00 HYPERCHOLESTEREMIA: ICD-10-CM

## 2024-04-12 PROCEDURE — 96372 THER/PROPH/DIAG INJ SC/IM: CPT | Performed by: NURSE PRACTITIONER

## 2024-04-12 RX ORDER — ALBUTEROL SULFATE 0.83 MG/ML
3 SOLUTION RESPIRATORY (INHALATION) AS NEEDED
OUTPATIENT
Start: 2024-04-13

## 2024-04-12 RX ORDER — DIPHENHYDRAMINE HYDROCHLORIDE 50 MG/ML
50 INJECTION INTRAMUSCULAR; INTRAVENOUS AS NEEDED
OUTPATIENT
Start: 2024-04-13

## 2024-04-12 RX ORDER — EPINEPHRINE 0.3 MG/.3ML
0.3 INJECTION SUBCUTANEOUS EVERY 5 MIN PRN
OUTPATIENT
Start: 2024-04-13

## 2024-04-12 RX ORDER — FAMOTIDINE 10 MG/ML
20 INJECTION INTRAVENOUS ONCE AS NEEDED
OUTPATIENT
Start: 2024-04-13

## 2024-04-12 ASSESSMENT — ENCOUNTER SYMPTOMS
NECK PAIN: 0
LIGHT-HEADEDNESS: 0
SORE THROAT: 0
NUMBNESS: 0
FEVER: 0
DIARRHEA: 0
BRUISES/BLEEDS EASILY: 0
DIZZINESS: 0
NAUSEA: 0
DYSURIA: 0
TROUBLE SWALLOWING: 0
DIFFICULTY URINATING: 0
FREQUENCY: 0
CONSTIPATION: 0
WOUND: 0
SPEECH DIFFICULTY: 0
LEG SWELLING: 0
EYE PROBLEMS: 0
FATIGUE: 0
NERVOUS/ANXIOUS: 0
PALPITATIONS: 0
COUGH: 0
DEPRESSION: 0
SHORTNESS OF BREATH: 0
HEADACHES: 0
VOMITING: 0
WHEEZING: 0

## 2024-04-12 NOTE — PATIENT INSTRUCTIONS
Today :We administered inclisiran.     For:   1. Hypercholesteremia         Your next appointment is due in:  6 MONTHS         Please read the  Medication Guide that was given to you and reviewed during todays visit.     (Tell all doctors including dentists that you are taking this medication)     Go to the emergency room or call 911 if:  -You have signs of allergic reaction:   -Rash, hives, itching.   -Swollen, blistered, peeling skin.   -Swelling of face, lips, mouth, tongue or throat.   -Tightness of chest, trouble breathing, swallowing or talking     Call your doctor:  - If IV / injection site gets red, warm, swollen, itchy or leaks fluid or pus.     (Leave dressing on your IV site for at least 2 hours and keep area clean and dry  - If you get sick or have symptoms of infection or are not feeling well for any reason.    (Wash your hands often, stay away from people who are sick)  - If you have side effects from your medication that do not go away or are bothersome.     (Refer to the teaching your nurse gave you for side effects to call your doctor about)    - Common side effects may include:  stuffy nose, headache, feeling tired, muscle aches, upset stomach  - Before receiving any vaccines     - Call [facility] at   If:  - You get sick, are on antibiotics, have had a recent vaccine, have surgery or dental work and your doctor wants your visit rescheduled.  - You need to cancel and reschedule your visit for any reason. Call at least 2 days before your visit if you need to cancel.   - Your insurance changes before your next visit.    (We will need to get approval from your new insurance. This can take up to two weeks.)     The [facility] is opened Monday thru Friday. We are closed on weekends and holidays.   Voice mail will take your call if the center is closed. If you leave a message please allow 24 hours for a call back during weekdays. If you leave a message on a  weekend/holiday, we will call you back the next business day.

## 2024-04-12 NOTE — PROGRESS NOTES
[facility]   infusion Clinic Note   Date: 2024   Name: Terrence Jackson  : 1955   MRN: [id number]         Reason for Visit: Follow-up and Injections (PT HERE FOR LEQVIO 284 MG/NEXT APPT: 6 MONTHS )       Visit Type:: injection     Ordered By: CHADD    Accompanied by: Self      Diagnosis: Hypercholesteremia      Allergies:   Allergies as of 2024    (No Known Allergies)        Current Meds has a current medication list which includes the following prescription(s): acetaminophen, amlodipine, aspirin, atorvastatin, cyclobenzaprine, loratadine, loratadine, loratadine, losartan, and tobramycin.          Vitals:  Vitals:    24 0902   BP: 164/89   Pulse: 73   Resp: 17   Temp: 36.7 °C (98.1 °F)   SpO2: 99%   Weight: 87.6 kg (193 lb 2 oz)            Infusion Pre-procedure Checklist:   Allergies reviewed: yes   Medications reviewed: yes     Previous reaction to current treatment:No      Assess patient for the concerns below. Document provider notification as appropriate.  - Active or recent infection with/without current antibiotic use No  - Recent or planned invasive dental work No  - Recent or planned surgeries No  - Recently received or plans to receive vaccinations No  - Has treatment related toxicities No  - Is pregnant  N/A    - Does the patient meet criteria to treat? Yes    Provider notified: Not applicable      Pain: '    Is the pain different from normal: No   Is the pain tolerable: n/a   Is your Doctor aware: n/a       Labs: Labs reviewed      Fall Risk Screening: Duff Fall Risk  History of Falling, Immediate or Within 3 Months: No  Secondary Diagnosis: No  Ambulatory Aid: Walks without aid/bedrest/nurse assist  Intravenous Therapy/Heparin Lock: No  Gait/Transferring: Normal/bedrest/immobile  Mental Status: Oriented to own ability  Duff Fall Risk Score: 0       Review of Systems   Constitutional:  Negative for fatigue and fever.   HENT:   Negative for hearing  "loss, sore throat, tinnitus and trouble swallowing.    Eyes:  Negative for eye problems.   Respiratory:  Negative for cough, shortness of breath and wheezing.    Cardiovascular:  Negative for chest pain, leg swelling and palpitations.   Gastrointestinal:  Negative for constipation, diarrhea, nausea and vomiting.   Genitourinary:  Negative for bladder incontinence, difficulty urinating, dysuria and frequency.    Musculoskeletal:  Negative for neck pain.   Skin:  Negative for itching, rash and wound.   Neurological:  Negative for dizziness, headaches, light-headedness, numbness and speech difficulty.   Hematological:  Does not bruise/bleed easily.   Psychiatric/Behavioral:  Negative for depression. The patient is not nervous/anxious.       Negative for complaint: [] all other systems have been reviewed and are negative for complaint   Infusion Readiness:   Assessment Concerns Related to Infusion: No  Provider notified: n/a      Document Below Only If Indicated:   Patient Education:       Drug Specific Questions:    (Unless otherwise specified on patient specific therapy plan):     REMINDER:  Recommended Vitals/Observation:  Vitals: Obtain vital signs prior to injection and at end of observation period.    Observation: Patient may leave 30 minutes after the FIRST injection. Patient may leave immediately following injection for all subsequent doses    Lab Results   Component Value Date    CHOL 189 02/07/2024    CHOL 167 11/27/2023    CHOL 192 10/16/2023     Lab Results   Component Value Date    HDL 30.9 02/07/2024    HDL 35.3 11/27/2023    HDL 39.3 10/16/2023     Lab Results   Component Value Date    LDLCALC 115 (H) 02/07/2024    LDLCALC 98 11/27/2023    LDLCALC 122 (H) 10/16/2023     Lab Results   Component Value Date    TRIG 215 (H) 02/07/2024    TRIG 169 (H) 11/27/2023    TRIG 154 (H) 10/16/2023     No components found for: \"CHOLHDL\"         Weight Based Drug Calculations:  FLAT DOSE       Initiated By: Irvin " DAMIEN Vines   Time: 9:03 AM     We administered inclisiran.

## 2024-05-13 ENCOUNTER — APPOINTMENT (OUTPATIENT)
Dept: PRIMARY CARE | Facility: CLINIC | Age: 69
End: 2024-05-13
Payer: MEDICARE

## 2024-05-28 ENCOUNTER — APPOINTMENT (OUTPATIENT)
Dept: PRIMARY CARE | Facility: CLINIC | Age: 69
End: 2024-05-28
Payer: MEDICARE

## 2024-05-31 ENCOUNTER — LAB (OUTPATIENT)
Dept: LAB | Facility: LAB | Age: 69
End: 2024-05-31
Payer: MEDICARE

## 2024-05-31 DIAGNOSIS — E78.00 HYPERCHOLESTEROLEMIA: ICD-10-CM

## 2024-05-31 DIAGNOSIS — I10 PRIMARY HYPERTENSION: ICD-10-CM

## 2024-05-31 LAB
ALBUMIN SERPL BCP-MCNC: 4.2 G/DL (ref 3.4–5)
ALP SERPL-CCNC: 73 U/L (ref 33–136)
ALT SERPL W P-5'-P-CCNC: 25 U/L (ref 10–52)
AMMONIA PLAS-SCNC: 31 UMOL/L (ref 16–53)
ANION GAP SERPL CALC-SCNC: 12 MMOL/L (ref 10–20)
AST SERPL W P-5'-P-CCNC: 17 U/L (ref 9–39)
BILIRUB SERPL-MCNC: 0.6 MG/DL (ref 0–1.2)
BUN SERPL-MCNC: 21 MG/DL (ref 6–23)
CALCIUM SERPL-MCNC: 9.3 MG/DL (ref 8.6–10.6)
CHLORIDE SERPL-SCNC: 107 MMOL/L (ref 98–107)
CHOLEST SERPL-MCNC: 141 MG/DL (ref 0–199)
CHOLESTEROL/HDL RATIO: 3.9
CO2 SERPL-SCNC: 25 MMOL/L (ref 21–32)
CREAT SERPL-MCNC: 1.24 MG/DL (ref 0.5–1.3)
EGFRCR SERPLBLD CKD-EPI 2021: 64 ML/MIN/1.73M*2
GLUCOSE SERPL-MCNC: 109 MG/DL (ref 74–99)
HDLC SERPL-MCNC: 36.6 MG/DL
LDLC SERPL CALC-MCNC: 75 MG/DL
NON HDL CHOLESTEROL: 104 MG/DL (ref 0–149)
POTASSIUM SERPL-SCNC: 4.4 MMOL/L (ref 3.5–5.3)
PROT SERPL-MCNC: 6.7 G/DL (ref 6.4–8.2)
SODIUM SERPL-SCNC: 140 MMOL/L (ref 136–145)
TRIGL SERPL-MCNC: 149 MG/DL (ref 0–149)
VLDL: 30 MG/DL (ref 0–40)

## 2024-05-31 PROCEDURE — 82140 ASSAY OF AMMONIA: CPT

## 2024-05-31 PROCEDURE — 36415 COLL VENOUS BLD VENIPUNCTURE: CPT

## 2024-05-31 PROCEDURE — 80061 LIPID PANEL: CPT

## 2024-05-31 PROCEDURE — 80053 COMPREHEN METABOLIC PANEL: CPT

## 2024-06-03 ENCOUNTER — OFFICE VISIT (OUTPATIENT)
Dept: PRIMARY CARE | Facility: CLINIC | Age: 69
End: 2024-06-03
Payer: MEDICARE

## 2024-06-03 VITALS
SYSTOLIC BLOOD PRESSURE: 118 MMHG | HEIGHT: 71 IN | WEIGHT: 190 LBS | DIASTOLIC BLOOD PRESSURE: 68 MMHG | BODY MASS INDEX: 26.6 KG/M2

## 2024-06-03 DIAGNOSIS — Z12.5 PROSTATE CANCER SCREENING: Primary | ICD-10-CM

## 2024-06-03 DIAGNOSIS — I10 ESSENTIAL (PRIMARY) HYPERTENSION: ICD-10-CM

## 2024-06-03 DIAGNOSIS — E78.00 HYPERCHOLESTEROLEMIA: ICD-10-CM

## 2024-06-03 PROCEDURE — 3074F SYST BP LT 130 MM HG: CPT | Performed by: INTERNAL MEDICINE

## 2024-06-03 PROCEDURE — 1159F MED LIST DOCD IN RCRD: CPT | Performed by: INTERNAL MEDICINE

## 2024-06-03 PROCEDURE — 3078F DIAST BP <80 MM HG: CPT | Performed by: INTERNAL MEDICINE

## 2024-06-03 PROCEDURE — 99213 OFFICE O/P EST LOW 20 MIN: CPT | Performed by: INTERNAL MEDICINE

## 2024-06-03 RX ORDER — LOSARTAN POTASSIUM 100 MG/1
100 TABLET ORAL DAILY
Qty: 90 TABLET | Refills: 0 | Status: SHIPPED | OUTPATIENT
Start: 2024-06-03

## 2024-06-03 ASSESSMENT — ENCOUNTER SYMPTOMS
LOSS OF SENSATION IN FEET: 0
DEPRESSION: 0
OCCASIONAL FEELINGS OF UNSTEADINESS: 0

## 2024-06-04 NOTE — PROGRESS NOTES
"Subjective   Patient ID: Terrence Jackson is a 69 y.o. male who presents for Follow-up (on various conditions).    This gentleman today came here for follow-up on various conditions.  Overall, he is a happy person.  Appetite and weight are okay.  No chest pain.  No shortness of breath.  Taking medications regularly with no side effects.  He told me that he is taking this injectable cholesterol pill, ______(Leqvio?), luckily his copay is affordable for him.  It is a twice yearly shot.    I have personally reviewed the patient's Past Medical History, Medications, Allergies, Social History, and Family History in the EMR.    Review of Systems   All other systems reviewed and are negative.    Objective   /68   Ht 1.803 m (5' 11\")   Wt 86.2 kg (190 lb)   BMI 26.50 kg/m²     Physical Exam  Vitals reviewed.   Cardiovascular:      Heart sounds: Normal heart sounds, S1 normal and S2 normal. No murmur heard.     No friction rub.   Pulmonary:      Effort: Pulmonary effort is normal.      Breath sounds: Normal breath sounds and air entry.   Abdominal:      Palpations: There is no hepatomegaly, splenomegaly or mass.   Musculoskeletal:      Right lower leg: No edema.      Left lower leg: No edema.   Lymphadenopathy:      Lower Body: No right inguinal adenopathy. No left inguinal adenopathy.   Neurological:      Cranial Nerves: Cranial nerves 2-12 are intact.      Sensory: No sensory deficit.      Motor: Motor function is intact.      Deep Tendon Reflexes: Reflexes are normal and symmetric.     LAB WORK:  Laboratory testing discussed.    Assessment/Plan   Problem List Items Addressed This Visit             ICD-10-CM       Cardiac and Vasculature    Hypercholesterolemia E78.00    Relevant Orders    Comprehensive Metabolic Panel    Lipid Panel    Thyroid Stimulating Hormone     Other Visit Diagnoses         Codes    Prostate cancer screening    -  Primary Z12.5    Essential (primary) hypertension     I10    Relevant " Medications    losartan (Cozaar) 100 mg tablet    Other Relevant Orders    CBC        1. Hypertension, okay.  2. High cholesterol.  We will monitor.  3. Prostate, okay.  4. Follow-up appointment with me in three to four months, but always happy to serve him anytime sooner if necessary.    Scribe Attestation  By signing my name below, IHillary, Scrfederica attest that this documentation has been prepared under the direction and in the presence of Ry Acosta MD.

## 2024-09-05 ENCOUNTER — LAB (OUTPATIENT)
Dept: LAB | Facility: LAB | Age: 69
End: 2024-09-05
Payer: MEDICARE

## 2024-09-05 DIAGNOSIS — E78.00 HYPERCHOLESTEROLEMIA: ICD-10-CM

## 2024-09-05 DIAGNOSIS — I10 ESSENTIAL (PRIMARY) HYPERTENSION: ICD-10-CM

## 2024-09-05 LAB
ALBUMIN SERPL BCP-MCNC: 4.3 G/DL (ref 3.4–5)
ALP SERPL-CCNC: 73 U/L (ref 33–136)
ALT SERPL W P-5'-P-CCNC: 25 U/L (ref 10–52)
ANION GAP SERPL CALC-SCNC: 12 MMOL/L (ref 10–20)
AST SERPL W P-5'-P-CCNC: 17 U/L (ref 9–39)
BILIRUB SERPL-MCNC: 0.5 MG/DL (ref 0–1.2)
BUN SERPL-MCNC: 28 MG/DL (ref 6–23)
CALCIUM SERPL-MCNC: 9.6 MG/DL (ref 8.6–10.6)
CHLORIDE SERPL-SCNC: 107 MMOL/L (ref 98–107)
CHOLEST SERPL-MCNC: 179 MG/DL (ref 0–199)
CHOLESTEROL/HDL RATIO: 5
CO2 SERPL-SCNC: 25 MMOL/L (ref 21–32)
CREAT SERPL-MCNC: 1.29 MG/DL (ref 0.5–1.3)
EGFRCR SERPLBLD CKD-EPI 2021: 61 ML/MIN/1.73M*2
ERYTHROCYTE [DISTWIDTH] IN BLOOD BY AUTOMATED COUNT: 13.7 % (ref 11.5–14.5)
GLUCOSE SERPL-MCNC: 107 MG/DL (ref 74–99)
HCT VFR BLD AUTO: 43.6 % (ref 41–52)
HDLC SERPL-MCNC: 36.1 MG/DL
HGB BLD-MCNC: 14.2 G/DL (ref 13.5–17.5)
LDLC SERPL CALC-MCNC: 114 MG/DL
MCH RBC QN AUTO: 28.5 PG (ref 26–34)
MCHC RBC AUTO-ENTMCNC: 32.6 G/DL (ref 32–36)
MCV RBC AUTO: 87 FL (ref 80–100)
NON HDL CHOLESTEROL: 143 MG/DL (ref 0–149)
NRBC BLD-RTO: 0 /100 WBCS (ref 0–0)
PLATELET # BLD AUTO: 360 X10*3/UL (ref 150–450)
POTASSIUM SERPL-SCNC: 4.4 MMOL/L (ref 3.5–5.3)
PROT SERPL-MCNC: 6.9 G/DL (ref 6.4–8.2)
RBC # BLD AUTO: 4.99 X10*6/UL (ref 4.5–5.9)
SODIUM SERPL-SCNC: 140 MMOL/L (ref 136–145)
TRIGL SERPL-MCNC: 145 MG/DL (ref 0–149)
TSH SERPL-ACNC: 2.82 MIU/L (ref 0.44–3.98)
VLDL: 29 MG/DL (ref 0–40)
WBC # BLD AUTO: 10.6 X10*3/UL (ref 4.4–11.3)

## 2024-09-05 PROCEDURE — 84443 ASSAY THYROID STIM HORMONE: CPT

## 2024-09-05 PROCEDURE — 80053 COMPREHEN METABOLIC PANEL: CPT

## 2024-09-05 PROCEDURE — 85027 COMPLETE CBC AUTOMATED: CPT

## 2024-09-05 PROCEDURE — 36415 COLL VENOUS BLD VENIPUNCTURE: CPT

## 2024-09-05 PROCEDURE — 80061 LIPID PANEL: CPT

## 2024-09-09 ENCOUNTER — APPOINTMENT (OUTPATIENT)
Dept: PRIMARY CARE | Facility: CLINIC | Age: 69
End: 2024-09-09
Payer: MEDICARE

## 2024-09-09 VITALS
SYSTOLIC BLOOD PRESSURE: 138 MMHG | HEIGHT: 71 IN | WEIGHT: 189 LBS | DIASTOLIC BLOOD PRESSURE: 74 MMHG | BODY MASS INDEX: 26.46 KG/M2

## 2024-09-09 DIAGNOSIS — E78.00 HYPERCHOLESTEROLEMIA: ICD-10-CM

## 2024-09-09 DIAGNOSIS — I10 ESSENTIAL (PRIMARY) HYPERTENSION: ICD-10-CM

## 2024-09-09 DIAGNOSIS — Z12.5 PROSTATE CANCER SCREENING: ICD-10-CM

## 2024-09-09 DIAGNOSIS — Z13.29 THYROID DISORDER SCREENING: ICD-10-CM

## 2024-09-09 PROCEDURE — 3075F SYST BP GE 130 - 139MM HG: CPT | Performed by: INTERNAL MEDICINE

## 2024-09-09 PROCEDURE — 3078F DIAST BP <80 MM HG: CPT | Performed by: INTERNAL MEDICINE

## 2024-09-09 PROCEDURE — 1159F MED LIST DOCD IN RCRD: CPT | Performed by: INTERNAL MEDICINE

## 2024-09-09 PROCEDURE — 99213 OFFICE O/P EST LOW 20 MIN: CPT | Performed by: INTERNAL MEDICINE

## 2024-09-09 PROCEDURE — 3008F BODY MASS INDEX DOCD: CPT | Performed by: INTERNAL MEDICINE

## 2024-09-09 ASSESSMENT — ENCOUNTER SYMPTOMS
LOSS OF SENSATION IN FEET: 0
OCCASIONAL FEELINGS OF UNSTEADINESS: 0
DEPRESSION: 0

## 2024-09-10 NOTE — PROGRESS NOTES
"Subjective   Patient ID: Terrence Jackson is a 69 y.o. male who presents for Follow-up.    This gentleman today came here for follow-up on various conditions.  He is a man of good habits.  He is retired, he is enjoying.  He is taking medications regularly with no side effects.    I have personally reviewed the patient's Past Medical History, Medications, Allergies, Social History, and Family History in the EMR.    Review of Systems   All other systems reviewed and are negative.    Objective   /74   Ht 1.803 m (5' 11\")   Wt 85.7 kg (189 lb)   BMI 26.36 kg/m²     Physical Exam  Vitals reviewed.   Cardiovascular:      Heart sounds: Normal heart sounds, S1 normal and S2 normal. No murmur heard.     No friction rub.   Pulmonary:      Effort: Pulmonary effort is normal.      Breath sounds: Normal breath sounds and air entry.   Abdominal:      Palpations: There is no hepatomegaly, splenomegaly or mass.   Musculoskeletal:      Right lower leg: No edema.      Left lower leg: No edema.   Lymphadenopathy:      Lower Body: No right inguinal adenopathy. No left inguinal adenopathy.   Neurological:      Cranial Nerves: Cranial nerves 2-12 are intact.      Sensory: No sensory deficit.      Motor: Motor function is intact.      Deep Tendon Reflexes: Reflexes are normal and symmetric.     LAB WORK:  Laboratory testing discussed.    Assessment/Plan   Problem List Items Addressed This Visit             ICD-10-CM       Cardiac and Vasculature    Hypercholesterolemia E78.00    Relevant Orders    Lipid Panel    Comprehensive Metabolic Panel     Other Visit Diagnoses         Codes    Essential (primary) hypertension     I10    Relevant Orders    CBC    Prostate cancer screening     Z12.5    Relevant Orders    Prostate Specific Antigen    Thyroid disorder screening     Z13.29    Relevant Orders    Thyroid Stimulating Hormone        1. Hypertension, okay.  Kidney okay.  2. Cholesterol.  Monitor.  3. Prostate health, stable.  4. " Blood work very nice.  5. He is taking ______ shot, doing good.  No problem.  6. Follow-up appointment in December first week.  Happy to see him anytime sooner if necessary.    Scribe Attestation  By signing my name below, IEllen Scribe attest that this documentation has been prepared under the direction and in the presence of Ry Acosta MD.

## 2024-10-10 DIAGNOSIS — E78.00 HYPERCHOLESTEREMIA: Primary | ICD-10-CM

## 2024-10-10 RX ORDER — FAMOTIDINE 10 MG/ML
20 INJECTION INTRAVENOUS ONCE AS NEEDED
OUTPATIENT
Start: 2024-10-10

## 2024-10-10 RX ORDER — DIPHENHYDRAMINE HYDROCHLORIDE 50 MG/ML
50 INJECTION INTRAMUSCULAR; INTRAVENOUS AS NEEDED
OUTPATIENT
Start: 2024-10-10

## 2024-10-10 RX ORDER — EPINEPHRINE 0.3 MG/.3ML
0.3 INJECTION SUBCUTANEOUS EVERY 5 MIN PRN
OUTPATIENT
Start: 2024-10-10

## 2024-10-10 RX ORDER — ALBUTEROL SULFATE 0.83 MG/ML
3 SOLUTION RESPIRATORY (INHALATION) AS NEEDED
OUTPATIENT
Start: 2024-10-10

## 2024-10-11 ENCOUNTER — APPOINTMENT (OUTPATIENT)
Dept: INFUSION THERAPY | Facility: CLINIC | Age: 69
End: 2024-10-11
Payer: MEDICARE

## 2024-10-17 ENCOUNTER — APPOINTMENT (OUTPATIENT)
Dept: INFUSION THERAPY | Facility: CLINIC | Age: 69
End: 2024-10-17
Payer: MEDICARE

## 2024-10-17 VITALS
TEMPERATURE: 97.5 F | DIASTOLIC BLOOD PRESSURE: 77 MMHG | OXYGEN SATURATION: 93 % | HEART RATE: 62 BPM | RESPIRATION RATE: 16 BRPM | SYSTOLIC BLOOD PRESSURE: 144 MMHG

## 2024-10-17 DIAGNOSIS — E78.00 HYPERCHOLESTEREMIA: ICD-10-CM

## 2024-10-17 RX ORDER — ALBUTEROL SULFATE 0.83 MG/ML
3 SOLUTION RESPIRATORY (INHALATION) AS NEEDED
OUTPATIENT
Start: 2025-04-08

## 2024-10-17 RX ORDER — DIPHENHYDRAMINE HYDROCHLORIDE 50 MG/ML
50 INJECTION INTRAMUSCULAR; INTRAVENOUS AS NEEDED
OUTPATIENT
Start: 2025-04-08

## 2024-10-17 RX ORDER — EPINEPHRINE 0.3 MG/.3ML
0.3 INJECTION SUBCUTANEOUS EVERY 5 MIN PRN
OUTPATIENT
Start: 2025-04-08

## 2024-10-17 RX ORDER — FAMOTIDINE 10 MG/ML
20 INJECTION INTRAVENOUS ONCE AS NEEDED
OUTPATIENT
Start: 2025-04-08

## 2024-10-17 ASSESSMENT — ENCOUNTER SYMPTOMS
DIZZINESS: 0
LIGHT-HEADEDNESS: 0
PALPITATIONS: 0
HEADACHES: 0
WHEEZING: 0
EXTREMITY WEAKNESS: 0
NUMBNESS: 0
LEG SWELLING: 0
WOUND: 0
COUGH: 0
SHORTNESS OF BREATH: 0

## 2024-10-17 NOTE — PROGRESS NOTES
[facility]   Infusion Clinic Note   Date: 2024   Name: Terrence Jackson  : 1955   MRN: [id number]          Reason for Visit: Injections (Every 6 months Leqvio 284mg subcutaneous injection)         Today: We administered inclisiran.       Visit Type: INJECTION       Ordered By: Ry Acosta MD       Accompanied by: Self       Diagnosis: Hypercholesteremia        Allergies:   Allergies as of 10/17/2024   • (No Known Allergies)          Current Medications has a current medication list which includes the following prescription(s): acetaminophen, amlodipine, aspirin, atorvastatin, cyclobenzaprine, inclisiran, loratadine, losartan, and tobramycin, and the following Facility-Administered Medications: albuterol, dextrose, diphenhydramine, epinephrine, famotidine pf, inclisiran, methylprednisolone sod succinate, and sodium chloride.       Vitals:   Vitals:    10/17/24 1116   BP: 144/77   Pulse: 62   Resp: 16   Temp: 36.4 °C (97.5 °F)   TempSrc: Temporal   SpO2: 93%             Infusion Pre-procedure Checklist:   - Allergies reviewed: yes   - Medications reviewed: yes       - Previous reaction to current treatment: no      Assess patient for the concerns below. Document provider notification as appropriate.  - Active or recent infection with/without current antibiotic use: no  - Recent or planned invasive dental work: no  - Recent or planned surgeries: no  - Recently received or plans to receive vaccinations: no  - Has treatment related toxicities: no  - Is pregnant:  n/a      Pain: 0   - Is the pain different from normal: n/a   - Is your Doctor aware:  n/a                Fall Risk Screening: Sherin Fall Risk  History of Falling, Immediate or Within 3 Months: No  Ambulatory Aid: Walks without aid/bedrest/nurse assist  Intravenous Therapy/Heparin Lock: No  Gait/Transferring: Normal/bedrest/immobile  Mental Status: Oriented to own ability       Review Of Systems:  Review of Systems    Respiratory:  Negative for cough, shortness of breath and wheezing.    Cardiovascular:  Negative for chest pain, leg swelling and palpitations.   Skin:  Negative for itching, rash and wound.   Neurological:  Negative for dizziness, extremity weakness, headaches, light-headedness and numbness.        H/O ocular migraines         ROS completed? Yes      Infusion Readiness:  - Assessment Concerns Related to Infusion: No  - Provider notified: n/a      Document Below Only If Indicated:   New Patient Education:    N/A (returning patient for continuation of therapy. Ongoing education provided as needed.)        Treatment Conditions & Drug Specific Questions:    Inclisiran (LEQVIO)     (Unless otherwise specified on patient specific therapy plan):     REMINDER:  Recommended Vitals/Observation:  Vitals: Obtain vital signs prior to injection and at end of observation period.    Observation: Patient may leave 30 minutes after the FIRST injection. Patient may leave immediately following injection for all subsequent doses    Lab Results   Component Value Date    CHOL 179 09/05/2024    CHOL 141 05/31/2024    CHOL 189 02/07/2024     Lab Results   Component Value Date    HDL 36.1 09/05/2024    HDL 36.6 05/31/2024    HDL 30.9 02/07/2024     Lab Results   Component Value Date    LDLCALC 114 (H) 09/05/2024    LDLCALC 75 05/31/2024    LDLCALC 115 (H) 02/07/2024     Lab Results   Component Value Date    TRIG 145 09/05/2024    TRIG 149 05/31/2024    TRIG 215 (H) 02/07/2024         Weight Based Drug Calculations:    WEIGHT BASED DRUGS: NOT APPLICABLE / FLAT DOSE          Initiated By: Vivian Blevins LPN

## 2024-10-17 NOTE — PATIENT INSTRUCTIONS
Today :We administered inclisiran. Leqvio 284mg subcutaneous injection left upper arm  Lipid panel before next Leqvio appointment, wait at least 1 month    For:   1. Hypercholesteremia       Your next appointment is due in:  6 months        Please read the  Medication Guide that was given to you and reviewed during todays visit.     (Tell all doctors including dentists that you are taking this medication)     Go to the emergency room or call 911 if:  -You have signs of allergic reaction:   -Rash, hives, itching.   -Swollen, blistered, peeling skin.   -Swelling of face, lips, mouth, tongue or throat.   -Tightness of chest, trouble breathing, swallowing or talking     Call your doctor:  - If injection site gets red, warm, swollen, itchy or leaks fluid or pus.     (Leave band aid on your injection site for at least 2 hours and keep area clean and dry  - If you get sick or have symptoms of infection or are not feeling well for any reason.    (Wash your hands often, stay away from people who are sick)  - If you have side effects from your medication that do not go away or are bothersome.     (Refer to the teaching your nurse gave you for side effects to call your doctor about)    - Common side effects may include:  stuffy nose, headache, feeling tired, muscle aches, upset stomach  - Before receiving any vaccines     - Call [facility] at   If:  - You get sick, are on antibiotics, have had a recent vaccine, have surgery or dental work and your doctor wants your visit rescheduled.  - You need to cancel and reschedule your visit for any reason. Call at least 2 days before your visit if you need to cancel.   - Your insurance changes before your next visit.    (We will need to get approval from your new insurance. This can take up to two weeks.)     [facility] is opened Monday thru Friday. We are closed on weekends and holidays.   Voice mail will take your call if the center is closed.  If you leave a message please allow 24 hours for a call back during weekdays. If you leave a message on a weekend/holiday, we will call you back the next business day.

## 2024-12-03 ENCOUNTER — APPOINTMENT (OUTPATIENT)
Dept: PRIMARY CARE | Facility: CLINIC | Age: 69
End: 2024-12-03
Payer: MEDICARE

## 2024-12-10 ENCOUNTER — LAB (OUTPATIENT)
Dept: LAB | Facility: LAB | Age: 69
End: 2024-12-10
Payer: MEDICARE

## 2024-12-10 DIAGNOSIS — I10 ESSENTIAL (PRIMARY) HYPERTENSION: ICD-10-CM

## 2024-12-10 DIAGNOSIS — Z12.5 PROSTATE CANCER SCREENING: ICD-10-CM

## 2024-12-10 DIAGNOSIS — Z13.29 THYROID DISORDER SCREENING: ICD-10-CM

## 2024-12-10 DIAGNOSIS — E78.00 HYPERCHOLESTEROLEMIA: ICD-10-CM

## 2024-12-10 LAB
ALBUMIN SERPL BCP-MCNC: 4.3 G/DL (ref 3.4–5)
ALP SERPL-CCNC: 76 U/L (ref 33–136)
ALT SERPL W P-5'-P-CCNC: 29 U/L (ref 10–52)
ANION GAP SERPL CALC-SCNC: 11 MMOL/L (ref 10–20)
AST SERPL W P-5'-P-CCNC: 22 U/L (ref 9–39)
BILIRUB SERPL-MCNC: 0.6 MG/DL (ref 0–1.2)
BUN SERPL-MCNC: 17 MG/DL (ref 6–23)
CALCIUM SERPL-MCNC: 9.8 MG/DL (ref 8.6–10.3)
CHLORIDE SERPL-SCNC: 108 MMOL/L (ref 98–107)
CHOLEST SERPL-MCNC: 147 MG/DL (ref 0–199)
CHOLESTEROL/HDL RATIO: 4.6
CO2 SERPL-SCNC: 24 MMOL/L (ref 21–32)
CREAT SERPL-MCNC: 1.25 MG/DL (ref 0.5–1.3)
EGFRCR SERPLBLD CKD-EPI 2021: 63 ML/MIN/1.73M*2
ERYTHROCYTE [DISTWIDTH] IN BLOOD BY AUTOMATED COUNT: 13.7 % (ref 11.5–14.5)
GLUCOSE SERPL-MCNC: 107 MG/DL (ref 74–99)
HCT VFR BLD AUTO: 45.2 % (ref 41–52)
HDLC SERPL-MCNC: 31.8 MG/DL
HGB BLD-MCNC: 14.5 G/DL (ref 13.5–17.5)
LDLC SERPL CALC-MCNC: 80 MG/DL
MCH RBC QN AUTO: 29.1 PG (ref 26–34)
MCHC RBC AUTO-ENTMCNC: 32.1 G/DL (ref 32–36)
MCV RBC AUTO: 91 FL (ref 80–100)
NON HDL CHOLESTEROL: 115 MG/DL (ref 0–149)
NRBC BLD-RTO: 0 /100 WBCS (ref 0–0)
PLATELET # BLD AUTO: 373 X10*3/UL (ref 150–450)
POTASSIUM SERPL-SCNC: 4.1 MMOL/L (ref 3.5–5.3)
PROT SERPL-MCNC: 7.4 G/DL (ref 6.4–8.2)
PSA SERPL-MCNC: 0.96 NG/ML
RBC # BLD AUTO: 4.98 X10*6/UL (ref 4.5–5.9)
SODIUM SERPL-SCNC: 139 MMOL/L (ref 136–145)
TRIGL SERPL-MCNC: 176 MG/DL (ref 0–149)
TSH SERPL-ACNC: 2.82 MIU/L (ref 0.44–3.98)
VLDL: 35 MG/DL (ref 0–40)
WBC # BLD AUTO: 9.6 X10*3/UL (ref 4.4–11.3)

## 2024-12-10 PROCEDURE — 36415 COLL VENOUS BLD VENIPUNCTURE: CPT

## 2024-12-10 PROCEDURE — G0103 PSA SCREENING: HCPCS

## 2024-12-11 ENCOUNTER — APPOINTMENT (OUTPATIENT)
Dept: PRIMARY CARE | Facility: CLINIC | Age: 69
End: 2024-12-11
Payer: MEDICARE

## 2024-12-11 VITALS
BODY MASS INDEX: 26.74 KG/M2 | DIASTOLIC BLOOD PRESSURE: 74 MMHG | SYSTOLIC BLOOD PRESSURE: 136 MMHG | WEIGHT: 191 LBS | HEIGHT: 71 IN

## 2024-12-11 DIAGNOSIS — Z12.5 PROSTATE CANCER SCREENING: ICD-10-CM

## 2024-12-11 DIAGNOSIS — I10 ESSENTIAL (PRIMARY) HYPERTENSION: Primary | ICD-10-CM

## 2024-12-11 DIAGNOSIS — E78.00 HYPERCHOLESTEROLEMIA: ICD-10-CM

## 2024-12-11 PROCEDURE — 3008F BODY MASS INDEX DOCD: CPT | Performed by: INTERNAL MEDICINE

## 2024-12-11 PROCEDURE — 99213 OFFICE O/P EST LOW 20 MIN: CPT | Performed by: INTERNAL MEDICINE

## 2024-12-11 PROCEDURE — 3075F SYST BP GE 130 - 139MM HG: CPT | Performed by: INTERNAL MEDICINE

## 2024-12-11 PROCEDURE — 3078F DIAST BP <80 MM HG: CPT | Performed by: INTERNAL MEDICINE

## 2024-12-12 NOTE — PROGRESS NOTES
"Subjective   Patient ID: Terrence Jackson is a 69 y.o. male who presents for Follow-up (various conditions).    Terrence Jackson today came here for follow-up on various conditions.  Overall, he is a happy fellow.  Appetite and weight are okay.  No chest pain.  Taking medications regularly with no side effects.  He had a blood work taken.  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  He came here for follow-up on various conditions.    I have personally reviewed the patient's Past Medical History, Medications, Allergies, Social History, and Family History in the EMR.    Review of Systems   All other systems reviewed and are negative.    Objective   /74   Ht 1.803 m (5' 11\")   Wt 86.6 kg (191 lb)   BMI 26.64 kg/m²     Physical Exam  Vitals reviewed.   Cardiovascular:      Heart sounds: Normal heart sounds, S1 normal and S2 normal. No murmur heard.     No friction rub.   Pulmonary:      Effort: Pulmonary effort is normal.      Breath sounds: Normal breath sounds and air entry.   Abdominal:      Palpations: There is no hepatomegaly, splenomegaly or mass.   Musculoskeletal:      Right lower leg: No edema.      Left lower leg: No edema.   Lymphadenopathy:      Lower Body: No right inguinal adenopathy. No left inguinal adenopathy.   Skin:     Comments: Skin rash present.   Neurological:      Cranial Nerves: Cranial nerves 2-12 are intact.      Sensory: No sensory deficit.      Motor: Motor function is intact.      Deep Tendon Reflexes: Reflexes are normal and symmetric.     LAB WORK: Laboratory testing discussed.    Assessment/Plan   Problem List Items Addressed This Visit             ICD-10-CM       Cardiac and Vasculature    Hypercholesterolemia E78.00    Relevant Orders    Comprehensive Metabolic Panel    Lipid Panel     Other Visit Diagnoses         Codes    Essential (primary) hypertension    -  Primary I10    Prostate cancer screening     Z12.5        1. Skin ______ on face.  Couple of them are " concerning.  Referred to Derm for whole body checkup.  2. Hypertension, good.  Kidney, okay.  3. Cholesterol, monitor.  4. Prostate, good.  5. Follow-up blood work done yesterday, reviewed.  6. I shall see him back in March.  Happy to serve him anytime sooner if necessary.  7. He is exercising, pumping iron.  I told him to continue good work.    Lethaibe Attestation  By signing my name below, I, Fabi Hartman attest that this documentation has been prepared under the direction and in the presence of Ry Acosta MD.   All medical record entries made by the fabi were personally dictated by me I have reviewed the chart and agree the record accurately reflects my personal performance of his history physical examination and management

## 2025-01-20 ENCOUNTER — APPOINTMENT (OUTPATIENT)
Dept: AUDIOLOGY | Facility: CLINIC | Age: 70
End: 2025-01-20
Payer: MEDICARE

## 2025-01-20 ENCOUNTER — APPOINTMENT (OUTPATIENT)
Dept: OTOLARYNGOLOGY | Facility: CLINIC | Age: 70
End: 2025-01-20
Payer: MEDICARE

## 2025-03-22 LAB
ALBUMIN SERPL-MCNC: 4.4 G/DL (ref 3.6–5.1)
ALP SERPL-CCNC: 69 U/L (ref 35–144)
ALT SERPL-CCNC: 26 U/L (ref 9–46)
ANION GAP SERPL CALCULATED.4IONS-SCNC: 9 MMOL/L (CALC) (ref 7–17)
AST SERPL-CCNC: 22 U/L (ref 10–35)
BILIRUB SERPL-MCNC: 0.8 MG/DL (ref 0.2–1.2)
BUN SERPL-MCNC: 23 MG/DL (ref 7–25)
CALCIUM SERPL-MCNC: 9.2 MG/DL (ref 8.6–10.3)
CHLORIDE SERPL-SCNC: 109 MMOL/L (ref 98–110)
CHOLEST SERPL-MCNC: 183 MG/DL
CHOLEST/HDLC SERPL: 5.5 (CALC)
CO2 SERPL-SCNC: 22 MMOL/L (ref 20–32)
CREAT SERPL-MCNC: 1.2 MG/DL (ref 0.7–1.35)
EGFRCR SERPLBLD CKD-EPI 2021: 66 ML/MIN/1.73M2
GLUCOSE SERPL-MCNC: 107 MG/DL (ref 65–99)
HDLC SERPL-MCNC: 33 MG/DL
LDLC SERPL CALC-MCNC: 120 MG/DL (CALC)
NONHDLC SERPL-MCNC: 150 MG/DL (CALC)
POTASSIUM SERPL-SCNC: 4.3 MMOL/L (ref 3.5–5.3)
PROT SERPL-MCNC: 6.8 G/DL (ref 6.1–8.1)
SODIUM SERPL-SCNC: 140 MMOL/L (ref 135–146)
TRIGL SERPL-MCNC: 181 MG/DL

## 2025-03-24 ENCOUNTER — APPOINTMENT (OUTPATIENT)
Dept: PRIMARY CARE | Facility: CLINIC | Age: 70
End: 2025-03-24
Payer: MEDICARE

## 2025-03-24 VITALS
BODY MASS INDEX: 26.88 KG/M2 | HEIGHT: 71 IN | DIASTOLIC BLOOD PRESSURE: 70 MMHG | SYSTOLIC BLOOD PRESSURE: 128 MMHG | WEIGHT: 192 LBS

## 2025-03-24 DIAGNOSIS — E78.00 HYPERCHOLESTEROLEMIA: ICD-10-CM

## 2025-03-24 DIAGNOSIS — J18.9 PNEUMONIA DUE TO INFECTIOUS ORGANISM, UNSPECIFIED LATERALITY, UNSPECIFIED PART OF LUNG: ICD-10-CM

## 2025-03-24 DIAGNOSIS — Z23 NEED FOR PNEUMOCOCCAL VACCINE: ICD-10-CM

## 2025-03-24 DIAGNOSIS — I10 ESSENTIAL (PRIMARY) HYPERTENSION: Primary | ICD-10-CM

## 2025-03-24 DIAGNOSIS — Z13.29 THYROID DISORDER SCREENING: ICD-10-CM

## 2025-03-24 DIAGNOSIS — E08.65 DIABETES MELLITUS DUE TO UNDERLYING CONDITION WITH HYPERGLYCEMIA, WITHOUT LONG-TERM CURRENT USE OF INSULIN: ICD-10-CM

## 2025-03-24 DIAGNOSIS — Z00.00 MEDICARE ANNUAL WELLNESS VISIT, SUBSEQUENT: ICD-10-CM

## 2025-03-24 PROCEDURE — 1159F MED LIST DOCD IN RCRD: CPT | Performed by: INTERNAL MEDICINE

## 2025-03-24 PROCEDURE — 1160F RVW MEDS BY RX/DR IN RCRD: CPT | Performed by: INTERNAL MEDICINE

## 2025-03-24 PROCEDURE — 4010F ACE/ARB THERAPY RXD/TAKEN: CPT | Performed by: INTERNAL MEDICINE

## 2025-03-24 PROCEDURE — G0009 ADMIN PNEUMOCOCCAL VACCINE: HCPCS | Performed by: INTERNAL MEDICINE

## 2025-03-24 PROCEDURE — 3008F BODY MASS INDEX DOCD: CPT | Performed by: INTERNAL MEDICINE

## 2025-03-24 PROCEDURE — 1170F FXNL STATUS ASSESSED: CPT | Performed by: INTERNAL MEDICINE

## 2025-03-24 PROCEDURE — 3074F SYST BP LT 130 MM HG: CPT | Performed by: INTERNAL MEDICINE

## 2025-03-24 PROCEDURE — 3078F DIAST BP <80 MM HG: CPT | Performed by: INTERNAL MEDICINE

## 2025-03-24 PROCEDURE — 90677 PCV20 VACCINE IM: CPT | Performed by: INTERNAL MEDICINE

## 2025-03-24 PROCEDURE — G0439 PPPS, SUBSEQ VISIT: HCPCS | Performed by: INTERNAL MEDICINE

## 2025-03-24 ASSESSMENT — PATIENT HEALTH QUESTIONNAIRE - PHQ9
SUM OF ALL RESPONSES TO PHQ9 QUESTIONS 1 AND 2: 0
2. FEELING DOWN, DEPRESSED OR HOPELESS: NOT AT ALL
1. LITTLE INTEREST OR PLEASURE IN DOING THINGS: NOT AT ALL

## 2025-03-24 ASSESSMENT — ENCOUNTER SYMPTOMS
OCCASIONAL FEELINGS OF UNSTEADINESS: 0
DEPRESSION: 0
LOSS OF SENSATION IN FEET: 0

## 2025-03-24 ASSESSMENT — ACTIVITIES OF DAILY LIVING (ADL)
DOING_HOUSEWORK: INDEPENDENT
TAKING_MEDICATION: INDEPENDENT
DRESSING: INDEPENDENT
GROCERY_SHOPPING: INDEPENDENT
BATHING: INDEPENDENT
MANAGING_FINANCES: INDEPENDENT

## 2025-03-25 NOTE — PROGRESS NOTES
"Subjective   Patient ID: Terrence Jackson is a 69 y.o. male who presents for Medicare Annual Wellness Visit Subsequent (Other conditions).    Terrence Jackson is a 69-year-old young man today came here for Medicare Wellness Visit as well as follow-up on other conditions and blood work.  Appetite and weight are okay.  He just came from his golfing trip and he had a good time.    IMMUNIZATION:  I urged him to take pneumonia shot and shingles shot.  He is very agreeable.    I have personally reviewed the patient's Past Medical History, Medications, Allergies, Social History, and Family History in the EMR.    Review of Systems   All other systems reviewed and are negative.  He has never had heart attack, stroke, diabetes, or cancer.    Objective   /70   Ht 1.803 m (5' 11\")   Wt 87.1 kg (192 lb)   BMI 26.78 kg/m²     Physical Exam  Vitals reviewed.   Constitutional:       Comments: ______ okay.   HENT:      Right Ear: Tympanic membrane, ear canal and external ear normal.      Left Ear: Tympanic membrane, ear canal and external ear normal.   Eyes:      General: No scleral icterus.     Pupils: Pupils are equal, round, and reactive to light.   Neck:      Vascular: No carotid bruit.   Cardiovascular:      Heart sounds: Normal heart sounds, S1 normal and S2 normal. No murmur heard.     No friction rub.   Pulmonary:      Effort: Pulmonary effort is normal.      Breath sounds: Normal breath sounds and air entry.   Abdominal:      Palpations: There is no hepatomegaly, splenomegaly or mass.   Musculoskeletal:         General: No swelling or deformity. Normal range of motion.      Cervical back: Neck supple.      Right lower leg: No edema.      Left lower leg: No edema.   Lymphadenopathy:      Cervical: No cervical adenopathy.      Upper Body:      Right upper body: No axillary adenopathy.      Left upper body: No axillary adenopathy.      Lower Body: No right inguinal adenopathy. No left inguinal adenopathy. "   Neurological:      Mental Status: He is oriented to person, place, and time.      Cranial Nerves: Cranial nerves 2-12 are intact. No cranial nerve deficit.      Sensory: No sensory deficit.      Motor: Motor function is intact. No weakness.      Gait: Gait is intact.      Deep Tendon Reflexes: Reflexes normal.   Psychiatric:         Mood and Affect: Mood normal. Mood is not anxious or depressed. Affect is not angry.         Behavior: Behavior is not agitated.         Thought Content: Thought content normal.         Judgment: Judgment normal.     LAB WORK: Laboratory testing discussed.    Assessment/Plan   Problem List Items Addressed This Visit             ICD-10-CM       Cardiac and Vasculature    Hypercholesterolemia E78.00    Relevant Orders    CBC and Auto Differential    Comprehensive Metabolic Panel    Hemoglobin A1C    Lipid Panel    Thyroid Stimulating Hormone     Other Visit Diagnoses         Codes    Essential (primary) hypertension    -  Primary I10    Relevant Orders    CBC and Auto Differential    Comprehensive Metabolic Panel    Hemoglobin A1C    Lipid Panel    Thyroid Stimulating Hormone    Diabetes mellitus due to underlying condition with hyperglycemia, without long-term current use of insulin     E08.65    Relevant Orders    Hemoglobin A1C    Need for pneumococcal vaccine     Z23    Relevant Orders    Pneumococcal conjugate vaccine, 20-valent (PREVNAR 20) (Completed)    Medicare annual wellness visit, subsequent     Z00.00    Thyroid disorder screening     Z13.29    Pneumonia due to infectious organism, unspecified laterality, unspecified part of lung     J18.9        1. Medicare Wellness Visit done.  2. Hypertension, okay.  3. High cholesterol, okay.  4. Thyroid.  Monitor.  5. BPH, okay.  6. Pneumonia shot given.  The patient is doing good.  7. Follow up in three months with repeat blood work.  8. The patient is not depressed, not suicidal.  The patient is full code.  Wife is a legal power of  .    Lethaibmiguelina Attestation  By signing my name below, I, Shena Gonzalez attest that this documentation has been prepared under the direction and in the presence of Ry Acosta MD.     All medical record entries made by the scribe were personally dictated by me I have reviewed the chart and agree the record accurately reflects my personal performance of his history physical examination and management

## 2025-04-18 ENCOUNTER — APPOINTMENT (OUTPATIENT)
Dept: INFUSION THERAPY | Facility: CLINIC | Age: 70
End: 2025-04-18
Payer: MEDICARE

## 2025-04-18 VITALS
HEART RATE: 69 BPM | TEMPERATURE: 98.4 F | DIASTOLIC BLOOD PRESSURE: 82 MMHG | RESPIRATION RATE: 16 BRPM | BODY MASS INDEX: 26.8 KG/M2 | SYSTOLIC BLOOD PRESSURE: 143 MMHG | WEIGHT: 192.13 LBS | OXYGEN SATURATION: 99 %

## 2025-04-18 DIAGNOSIS — E78.00 HYPERCHOLESTEREMIA: Primary | ICD-10-CM

## 2025-04-18 RX ORDER — DIPHENHYDRAMINE HYDROCHLORIDE 50 MG/ML
50 INJECTION, SOLUTION INTRAMUSCULAR; INTRAVENOUS AS NEEDED
OUTPATIENT
Start: 2025-10-05

## 2025-04-18 RX ORDER — FAMOTIDINE 10 MG/ML
20 INJECTION, SOLUTION INTRAVENOUS ONCE AS NEEDED
OUTPATIENT
Start: 2025-10-05

## 2025-04-18 RX ORDER — EPINEPHRINE 0.3 MG/.3ML
0.3 INJECTION SUBCUTANEOUS EVERY 5 MIN PRN
OUTPATIENT
Start: 2025-10-05

## 2025-04-18 RX ORDER — ALBUTEROL SULFATE 0.83 MG/ML
3 SOLUTION RESPIRATORY (INHALATION) AS NEEDED
OUTPATIENT
Start: 2025-10-05

## 2025-04-18 ASSESSMENT — ENCOUNTER SYMPTOMS
EYE PROBLEMS: 1
EXTREMITY WEAKNESS: 0
LIGHT-HEADEDNESS: 0
DIZZINESS: 0
PALPITATIONS: 0
COUGH: 0
WOUND: 0
LEG SWELLING: 0
NUMBNESS: 0
SHORTNESS OF BREATH: 0
WHEEZING: 0

## 2025-04-18 NOTE — PATIENT INSTRUCTIONS
Today :We administered inclisiran.     For:   1. Hypercholesteremia         Your next appointment is due in:  6 MONTHS.        Please read the  Medication Guide that was given to you and reviewed during todays visit.     (Tell all doctors including dentists that you are taking this medication)     Go to the emergency room or call 911 if:  -You have signs of allergic reaction:   -Rash, hives, itching.   -Swollen, blistered, peeling skin.   -Swelling of face, lips, mouth, tongue or throat.   -Tightness of chest, trouble breathing, swallowing or talking     Call your doctor:  - If IV / injection site gets red, warm, swollen, itchy or leaks fluid or pus.     (Leave dressing on your IV site for at least 2 hours and keep area clean and dry  - If you get sick or have symptoms of infection or are not feeling well for any reason.    (Wash your hands often, stay away from people who are sick)  - If you have side effects from your medication that do not go away or are bothersome.     (Refer to the teaching your nurse gave you for side effects to call your doctor about)    - Common side effects may include:  stuffy nose, headache, feeling tired, muscle aches, upset stomach  - Before receiving any vaccines     - Call the [facility] at   If:  - You get sick, are on antibiotics, have had a recent vaccine, have surgery or dental work and your doctor wants your visit rescheduled.  - You need to cancel and reschedule your visit for any reason. Call at least 2 days before your visit if you need to cancel.   - Your insurance changes before your next visit.    (We will need to get approval from your new insurance. This can take up to two weeks.)     The [facility] is opened Monday thru Friday. We are closed on weekends and holidays.   Voice mail will take your call if the center is closed. If you leave a message please allow 24 hours for a call back during weekdays. If you leave a message on a  weekend/holiday, we will call you back the next business day.    A pharmacist is available Monday - Friday from 8:30AM to 3:30PM to help answer any questions you may have about your prescriptions(s). Please call pharmacy at:    [facility]: [phone]  [facility]: [phone]  [facility]: [phone]

## 2025-04-18 NOTE — PROGRESS NOTES
[facility]   Infusion Clinic Note   Date: 2025   Name: Terrence Jackson  : 1955   MRN: [id number]         Reason for Visit: Follow-up and Injections (PATIENT IS HERE FOR LEQVIO 284 MG SUBCUTANEOUS INJECTION EVERY 6 MONTHS)         Today: We administered inclisiran.       Ordered By: Ry Acosta MD       For a Diagnosis of: Hypercholesteremia       At today's visit patient accompanied by: Self      Today's Vitals:   Vitals:    25 1030   BP: 143/82   Pulse: 69   Resp: 16   Temp: 36.9 °C (98.4 °F)   SpO2: 99%   Weight: 87.1 kg (192 lb 2.1 oz)             Pre - Treatment Checklist:      - Previous reaction to current treatment: no      (Assess patient for the concerns below. Document provider notification as appropriate).  - Active or recent infection with/without current antibiotic use: no  - Recent or planned invasive dental work: no  - Recent or planned surgeries: no  - Recently received or plans to receive vaccinations: no  - Has treatment related toxicities: no  - Any chance may be pregnant:  n/a      Pain: 0   - Is the pain different from normal: n/a   - Is prescribing Doctor aware:  n/a      Labs: N/A      Fall Risk Screening: Duff Fall Risk  History of Falling, Immediate or Within 3 Months: No  Secondary Diagnosis: No  Ambulatory Aid: Walks without aid/bedrest/nurse assist  Intravenous Therapy/Heparin Lock: No  Gait/Transferring: Normal/bedrest/immobile  Mental Status: Oriented to own ability  Duff Fall Risk Score: 0       Review Of Systems:  Review of Systems   Eyes:  Positive for eye problems.        HISTORY OF OCULAR MIGRAINE EVERY 4-5 MONTHS.   Respiratory:  Negative for cough, shortness of breath and wheezing.    Cardiovascular:  Negative for chest pain, leg swelling and palpitations.   Skin:  Negative for itching, rash and wound.   Neurological:  Negative for dizziness, extremity weakness, light-headedness and numbness.         Infusion Readiness:  -  "Assessment Concerns Related to Infusion: No  - Provider notified: n/a      New Patient Education:    N/A (returning patient for continuation of therapy. Ongoing education provided as needed.)        Treatment Conditions & Drug Specific Questions:    Inclisiran (LEQVIO)     (Unless otherwise specified on patient specific therapy plan):     REMINDER:  Recommended Vitals/Observation:  Vitals: Obtain vital signs prior to injection and at end of observation period.    Observation: Patient may leave 30 minutes after the FIRST injection. Patient may leave immediately following injection for all subsequent doses    Lab Results   Component Value Date    CHOL 183 03/21/2025    CHOL 147 12/10/2024    CHOL 179 09/05/2024     Lab Results   Component Value Date    HDL 33 (L) 03/21/2025    HDL 31.8 12/10/2024    HDL 36.1 09/05/2024     Lab Results   Component Value Date    LDLCALC 120 (H) 03/21/2025    LDLCALC 80 12/10/2024    LDLCALC 114 (H) 09/05/2024     Lab Results   Component Value Date    TRIG 181 (H) 03/21/2025    TRIG 176 (H) 12/10/2024    TRIG 145 09/05/2024     No components found for: \"CHOLHDL\"         Weight Based Drug Calculations:    WEIGHT BASED DRUGS: NOT APPLICABLE / FLAT DOSE       Post Treatment: Patient tolerated treatment without issue and was discharged in no apparent distress.      Note Authored / Patient Cared for By: Dianna Blackwell RN        "

## 2025-06-03 ENCOUNTER — APPOINTMENT (OUTPATIENT)
Dept: PRIMARY CARE | Facility: CLINIC | Age: 70
End: 2025-06-03
Payer: MEDICARE

## 2025-06-20 LAB
ALBUMIN SERPL-MCNC: 4.4 G/DL (ref 3.6–5.1)
ALP SERPL-CCNC: 72 U/L (ref 35–144)
ALT SERPL-CCNC: 28 U/L (ref 9–46)
ANION GAP SERPL CALCULATED.4IONS-SCNC: 9 MMOL/L (CALC) (ref 7–17)
AST SERPL-CCNC: 18 U/L (ref 10–35)
BASOPHILS # BLD AUTO: 53 CELLS/UL (ref 0–200)
BASOPHILS NFR BLD AUTO: 0.6 %
BILIRUB SERPL-MCNC: 0.6 MG/DL (ref 0.2–1.2)
BUN SERPL-MCNC: 21 MG/DL (ref 7–25)
CALCIUM SERPL-MCNC: 9.4 MG/DL (ref 8.6–10.3)
CHLORIDE SERPL-SCNC: 108 MMOL/L (ref 98–110)
CHOLEST SERPL-MCNC: 141 MG/DL
CHOLEST/HDLC SERPL: 4 (CALC)
CO2 SERPL-SCNC: 22 MMOL/L (ref 20–32)
CREAT SERPL-MCNC: 1.17 MG/DL (ref 0.7–1.35)
EGFRCR SERPLBLD CKD-EPI 2021: 68 ML/MIN/1.73M2
EOSINOPHIL # BLD AUTO: 187 CELLS/UL (ref 15–500)
EOSINOPHIL NFR BLD AUTO: 2.1 %
ERYTHROCYTE [DISTWIDTH] IN BLOOD BY AUTOMATED COUNT: 13.5 % (ref 11–15)
EST. AVERAGE GLUCOSE BLD GHB EST-MCNC: 126 MG/DL
EST. AVERAGE GLUCOSE BLD GHB EST-SCNC: 7 MMOL/L
GLUCOSE SERPL-MCNC: 127 MG/DL (ref 65–99)
HBA1C MFR BLD: 6 %
HCT VFR BLD AUTO: 44.8 % (ref 38.5–50)
HDLC SERPL-MCNC: 35 MG/DL
HGB BLD-MCNC: 14.3 G/DL (ref 13.2–17.1)
LDLC SERPL CALC-MCNC: 81 MG/DL (CALC)
LYMPHOCYTES # BLD AUTO: 2092 CELLS/UL (ref 850–3900)
LYMPHOCYTES NFR BLD AUTO: 23.5 %
MCH RBC QN AUTO: 28.9 PG (ref 27–33)
MCHC RBC AUTO-ENTMCNC: 31.9 G/DL (ref 32–36)
MCV RBC AUTO: 90.7 FL (ref 80–100)
MONOCYTES # BLD AUTO: 1068 CELLS/UL (ref 200–950)
MONOCYTES NFR BLD AUTO: 12 %
NEUTROPHILS # BLD AUTO: 5500 CELLS/UL (ref 1500–7800)
NEUTROPHILS NFR BLD AUTO: 61.8 %
NONHDLC SERPL-MCNC: 106 MG/DL (CALC)
PLATELET # BLD AUTO: 351 THOUSAND/UL (ref 140–400)
PMV BLD REES-ECKER: 11 FL (ref 7.5–12.5)
POTASSIUM SERPL-SCNC: 4.5 MMOL/L (ref 3.5–5.3)
PROT SERPL-MCNC: 6.9 G/DL (ref 6.1–8.1)
RBC # BLD AUTO: 4.94 MILLION/UL (ref 4.2–5.8)
SODIUM SERPL-SCNC: 139 MMOL/L (ref 135–146)
TRIGL SERPL-MCNC: 151 MG/DL
TSH SERPL-ACNC: 1.58 MIU/L (ref 0.4–4.5)
WBC # BLD AUTO: 8.9 THOUSAND/UL (ref 3.8–10.8)

## 2025-06-23 ENCOUNTER — APPOINTMENT (OUTPATIENT)
Dept: PRIMARY CARE | Facility: CLINIC | Age: 70
End: 2025-06-23
Payer: MEDICARE

## 2025-06-23 VITALS
SYSTOLIC BLOOD PRESSURE: 126 MMHG | DIASTOLIC BLOOD PRESSURE: 74 MMHG | WEIGHT: 186.8 LBS | BODY MASS INDEX: 26.15 KG/M2 | HEIGHT: 71 IN

## 2025-06-23 DIAGNOSIS — E78.00 HYPERCHOLESTEREMIA: ICD-10-CM

## 2025-06-23 DIAGNOSIS — Z13.5 DIABETIC RETINOPATHY SCREENING: ICD-10-CM

## 2025-06-23 DIAGNOSIS — Z13.29 SCREENING FOR THYROID DISORDER: ICD-10-CM

## 2025-06-23 DIAGNOSIS — Z12.5 PROSTATE CANCER SCREENING: ICD-10-CM

## 2025-06-23 DIAGNOSIS — I10 ESSENTIAL (PRIMARY) HYPERTENSION: ICD-10-CM

## 2025-06-23 DIAGNOSIS — Z13.220 LIPID SCREENING: ICD-10-CM

## 2025-06-23 DIAGNOSIS — E08.65 DIABETES MELLITUS DUE TO UNDERLYING CONDITION WITH HYPERGLYCEMIA, WITHOUT LONG-TERM CURRENT USE OF INSULIN: Primary | ICD-10-CM

## 2025-06-23 PROCEDURE — 4010F ACE/ARB THERAPY RXD/TAKEN: CPT | Performed by: INTERNAL MEDICINE

## 2025-06-23 PROCEDURE — 1159F MED LIST DOCD IN RCRD: CPT | Performed by: INTERNAL MEDICINE

## 2025-06-23 PROCEDURE — 3008F BODY MASS INDEX DOCD: CPT | Performed by: INTERNAL MEDICINE

## 2025-06-23 PROCEDURE — 3074F SYST BP LT 130 MM HG: CPT | Performed by: INTERNAL MEDICINE

## 2025-06-23 PROCEDURE — 99214 OFFICE O/P EST MOD 30 MIN: CPT | Performed by: INTERNAL MEDICINE

## 2025-06-23 PROCEDURE — 3078F DIAST BP <80 MM HG: CPT | Performed by: INTERNAL MEDICINE

## 2025-06-24 NOTE — PROGRESS NOTES
"Subjective   Patient ID: Terrence Jackson is a 70 y.o. male who presents for Follow-up (various conditions).    Mr. Terrence Jackson today came here for follow-up on various conditions.  Overall, he is a happy person.  He works very hard.  He is still remodeling the bathroom and taking medications regularly with no side effects.    I have personally reviewed the patient's Past Medical History, Medications, Allergies, Social History, and Family History in the EMR.    Review of Systems   All other systems reviewed and are negative.    Objective   /74   Ht 1.803 m (5' 11\")   Wt 84.7 kg (186 lb 12.8 oz)   BMI 26.05 kg/m²     Physical Exam  Vitals reviewed.   Cardiovascular:      Heart sounds: Normal heart sounds, S1 normal and S2 normal. No murmur heard.     No friction rub.   Pulmonary:      Effort: Pulmonary effort is normal.      Breath sounds: Normal breath sounds and air entry.   Abdominal:      Palpations: There is no hepatomegaly, splenomegaly or mass.   Musculoskeletal:      Right lower leg: No edema.      Left lower leg: No edema.   Lymphadenopathy:      Lower Body: No right inguinal adenopathy. No left inguinal adenopathy.   Neurological:      Cranial Nerves: Cranial nerves 2-12 are intact.      Sensory: No sensory deficit.      Motor: Motor function is intact.      Deep Tendon Reflexes: Reflexes are normal and symmetric.     LAB WORK:  Laboratory testing discussed.    Assessment/Plan   Problem List Items Addressed This Visit           ICD-10-CM    Hypercholesteremia E78.00    Relevant Orders    Comprehensive Metabolic Panel     Other Visit Diagnoses         Codes      Diabetes mellitus due to underlying condition with hyperglycemia, without long-term current use of insulin    -  Primary E08.65      Essential (primary) hypertension     I10    Relevant Orders    Comprehensive Metabolic Panel      Diabetic retinopathy screening     Z13.5    Relevant Orders    Hemoglobin A1C      Lipid screening     " Z13.220    Relevant Orders    Lipid Panel      Screening for thyroid disorder     Z13.29    Relevant Orders    Thyroid Stimulating Hormone      Prostate cancer screening     Z12.5        1. Type 2 diabetes.  He has a strong family history.  We will talk about taking medication, metformin.  At the moment, he wants to continue diet and exercise.  He does not want medicine at the moment.  I am going to recheck in three months.  I reassured and explained to him about bad carb and good carb.  Advised diet and exercise.  He should be an open to the idea to take metformin.  He will let me know.  His alcohol consumption is extremely conservative.  I will keep an eye.  2. Hypertension, excellent.  3. Cholesterol, excellent.  4. Thyroid, okay.  5. Prostate health.  Monitor.  6. I shall see him in three months.    Scribe Attestation  By signing my name below, IEllen Scribe attest that this documentation has been prepared under the direction and in the presence of Ry Acosta MD.     All medical record entries made by the scribe were personally dictated by me I have reviewed the chart and agree the record accurately reflects my personal performance of his history physical examination and management

## 2025-09-22 ENCOUNTER — APPOINTMENT (OUTPATIENT)
Dept: PRIMARY CARE | Facility: CLINIC | Age: 70
End: 2025-09-22
Payer: MEDICARE

## 2025-10-20 ENCOUNTER — APPOINTMENT (OUTPATIENT)
Dept: INFUSION THERAPY | Facility: CLINIC | Age: 70
End: 2025-10-20
Payer: MEDICARE